# Patient Record
Sex: FEMALE | Race: WHITE | NOT HISPANIC OR LATINO | Employment: FULL TIME | URBAN - METROPOLITAN AREA
[De-identification: names, ages, dates, MRNs, and addresses within clinical notes are randomized per-mention and may not be internally consistent; named-entity substitution may affect disease eponyms.]

---

## 2018-08-10 PROCEDURE — 87086 URINE CULTURE/COLONY COUNT: CPT | Performed by: FAMILY MEDICINE

## 2018-08-12 ENCOUNTER — TELEPHONE (OUTPATIENT)
Dept: URGENT CARE | Facility: CLINIC | Age: 38
End: 2018-08-12

## 2018-08-12 NOTE — TELEPHONE ENCOUNTER
Notified Ms. Tate of urine culture results, she states her symptoms have resolved. Advised if they return follow up with PCP, voiced understanding.

## 2019-01-09 NOTE — PROGRESS NOTES
Encounter Date:2019    Location: [facility]    Keyon Ramsay MD    Patient ID: Karen Tate is a 38 y.o. 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    1980  Subjective:      Chief Complaint/Reason for visit:  Palpitations    Problem List:  1. Palpitations  A. Holter 18: AVG HR 83 bpm, rare PVC's  [id number] X 2  Congenital hep C- treated  LID  appy       HPI: Mrs. Tate is a 38 yr old female who presents in consultation today at the request of Tom Ramsay for further evaluation of her palpitations.  Patient states that she has had intermittent palpitations over the years however a crescendo pattern of late.  She will notice typically at rest fleeting skip beat and she will note that if she coughs it does help resolve the symptoms.  She's had no formal cardiac testing that she can state other than the monitor most recently placed.  Reviewing her diary with the monitor she had a ventricular couplet at which time she was symptomatic with palpitations for 5 seconds.  She notes that she is a busy mother and full-time employee does no regular exercise regimen and is interested in having clearance to do activity with an exercise regimen.    Social History     Socioeconomic History   • Marital status:      Spouse name: Not on file   • Number of children: Not on file   • Years of education: Not on file   • Highest education level: Not on file   Social Needs   • Financial resource strain: Not on file   • Food insecurity - worry: Not on file   • Food insecurity - inability: Not on file   • Transportation needs - medical: Not on file   • Transportation needs - non-medical: Not on file   Occupational History   • Not on file   Tobacco Use   • Smoking status: Never Smoker   • Smokeless tobacco: Never Used   Substance and Sexual Activity   • Alcohol use: No   • Drug use: No   • Sexual activity: Not on file   Other Topics Concern   • Not on file   Social History Narrative            family history is not on file.     has a past medical history of History of hepatitis C.    Allergies   Allergen Reactions   • Sulfa Antibiotics        No current outpatient medications on file.    Review of Systems   Constitution: Negative for chills, fever, weakness, malaise/fatigue, night sweats, weight gain and weight loss.   HENT: Negative for hearing loss and nosebleeds.    Eyes: Negative for blurred vision, vision loss in left eye, vision loss in right eye, visual disturbance and visual halos.   Cardiovascular: Positive for palpitations. Negative for chest pain, claudication, cyanosis, dyspnea on exertion, irregular heartbeat, leg swelling, near-syncope, orthopnea, paroxysmal nocturnal dyspnea and syncope.   Respiratory: Negative for cough, hemoptysis, shortness of breath, snoring and wheezing.    Endocrine: Negative for cold intolerance, heat intolerance, polydipsia, polyphagia and polyuria.   Hematologic/Lymphatic: Negative for adenopathy and bleeding problem. Does not bruise/bleed easily.   Skin: Negative for dry skin, poor wound healing and rash.   Musculoskeletal: Negative for falls, joint pain, joint swelling, muscle cramps, muscle weakness, myalgias and neck pain.   Gastrointestinal: Negative for bloating, abdominal pain, change in bowel habit, bowel incontinence, constipation, diarrhea, dysphagia, excessive appetite, heartburn, hematemesis, hematochezia, jaundice, melena, nausea and vomiting.   Genitourinary: Negative for bladder incontinence, dysuria, flank pain, hematuria, hesitancy and nocturia.   Neurological: Negative for aphonia, excessive daytime sleepiness, dizziness, focal weakness, headaches, light-headedness, loss of balance, seizures, sensory change, tremors and vertigo.   Psychiatric/Behavioral: Negative for altered mental status, depression, memory loss, substance abuse and suicidal ideas. The patient is not nervous/anxious.        Vitals:    01/11/19 1406   BP: 106/60   BP  "Location: Left arm   Patient Position: Sitting   Pulse: 77   Weight: 55.5 kg (122 lb 6.4 oz)   Height: 160 cm (63\")         Objective:       Physical Exam   Constitutional: She is oriented to person, place, and time. She appears well-developed and well-nourished. No distress.   HENT:   Head: Normocephalic and atraumatic.   Nose: Nose normal.   Mouth/Throat: Uvula is midline, oropharynx is clear and moist and mucous membranes are normal.   Eyes: Conjunctivae and EOM are normal. Pupils are equal, round, and reactive to light. No scleral icterus.   Neck: Normal range of motion. Neck supple. No hepatojugular reflux and no JVD present. Carotid bruit is not present. No tracheal deviation present. No thyromegaly present.   Cardiovascular: Normal rate, regular rhythm, S1 normal, S2 normal, intact distal pulses and normal pulses. PMI is not displaced. Exam reveals no gallop, no distant heart sounds, no friction rub, no midsystolic click and no opening snap.   No murmur heard.  Pulses:       Radial pulses are 2+ on the right side, and 2+ on the left side.        Dorsalis pedis pulses are 2+ on the right side, and 2+ on the left side.        Posterior tibial pulses are 2+ on the right side, and 2+ on the left side.   Pulmonary/Chest: Effort normal and breath sounds normal. She has no wheezes. She has no rhonchi. She has no rales.   Abdominal: Soft. Bowel sounds are normal. She exhibits no mass. There is no tenderness. There is no guarding.   Musculoskeletal: She exhibits no edema or tenderness.   Lymphadenopathy:     She has no cervical adenopathy.   Neurological: She is alert and oriented to person, place, and time.   Skin: Skin is warm, dry and intact. No rash noted. No cyanosis or erythema. Nails show no clubbing.   Psychiatric: She has a normal mood and affect. Her behavior is normal.   Nursing note and vitals reviewed.      Data Review:     ECG 12 Lead  Date/Time: 1/11/2019 3:07 PM  Performed by: Yobany Jacob, " MD  Authorized by: Yobany Jacob MD   Comparison: not compared with previous ECG   Rhythm: sinus rhythm  Conduction: incomplete RBBB  Clinical impression: abnormal ECG               Assessment:      Diagnosis Plan   1. Palpitation  Adult Transthoracic Echo Complete W/ Cont if Necessary Per Protocol     Plan:   Echocardiogram to assess for structural heart disease in follow-up thereafter.  Scribed for Yobany Jacob MD by Caitlyn Avina. 1/11/2019  2:56 PM  I, Yobany Jacob MD, personally performed the services described in this documentation as scribed by the above named individual in my presence, and it is both accurate and complete.  1/11/2019  3:00 PM        Please note that portions of this note may have been completed with a voice recognition program. Efforts were made to edit the dictations, but occasionally words are mistranscribed.

## 2019-01-11 ENCOUNTER — CONSULT (OUTPATIENT)
Dept: CARDIOLOGY | Facility: CLINIC | Age: 39
End: 2019-01-11

## 2019-01-11 ENCOUNTER — HOSPITAL ENCOUNTER (OUTPATIENT)
Dept: CARDIOLOGY | Facility: HOSPITAL | Age: 39
Discharge: HOME | End: 2019-01-11
Attending: INTERNAL MEDICINE | Admitting: INTERNAL MEDICINE

## 2019-01-11 VITALS
HEIGHT: 63 IN | SYSTOLIC BLOOD PRESSURE: 106 MMHG | BODY MASS INDEX: 21.69 KG/M2 | WEIGHT: 122.4 LBS | DIASTOLIC BLOOD PRESSURE: 60 MMHG | HEART RATE: 77 BPM

## 2019-01-11 DIAGNOSIS — R00.2 PALPITATION: ICD-10-CM

## 2019-01-11 DIAGNOSIS — I49.3 PVC (PREMATURE VENTRICULAR CONTRACTION): ICD-10-CM

## 2019-01-11 DIAGNOSIS — R00.2 PALPITATIONS: ICD-10-CM

## 2019-01-11 DIAGNOSIS — R00.2 PALPITATION: Primary | ICD-10-CM

## 2019-01-11 LAB
BH CV ECHO MEAS - AO ROOT AREA (BSA CORRECTED): 1.4
BH CV ECHO MEAS - AO ROOT AREA: 4 CM^2
BH CV ECHO MEAS - AO ROOT DIAM: 2.3 CM
BH CV ECHO MEAS - BSA(HAYCOCK): 1.6 M^2
BH CV ECHO MEAS - BSA: 1.6 M^2
BH CV ECHO MEAS - BZI_BMI: 21.6 KILOGRAMS/M^2
BH CV ECHO MEAS - BZI_METRIC_HEIGHT: 160 CM
BH CV ECHO MEAS - BZI_METRIC_WEIGHT: 55.3 KG
BH CV ECHO MEAS - EDV(CUBED): 77.1 ML
BH CV ECHO MEAS - EDV(TEICH): 81 ML
BH CV ECHO MEAS - EF(CUBED): 78.2 %
BH CV ECHO MEAS - EF(TEICH): 70.8 %
BH CV ECHO MEAS - ESV(CUBED): 16.8 ML
BH CV ECHO MEAS - ESV(TEICH): 23.7 ML
BH CV ECHO MEAS - FS: 39.8 %
BH CV ECHO MEAS - IVS/LVPW: 1
BH CV ECHO MEAS - IVSD: 0.73 CM
BH CV ECHO MEAS - LA DIMENSION: 2.4 CM
BH CV ECHO MEAS - LA/AO: 1
BH CV ECHO MEAS - LAD MAJOR: 4.6 CM
BH CV ECHO MEAS - LAT PEAK E' VEL: 16.7 CM/SEC
BH CV ECHO MEAS - LATERAL E/E' RATIO: 4.6
BH CV ECHO MEAS - LV MASS(C)D: 91.8 GRAMS
BH CV ECHO MEAS - LV MASS(C)DI: 58.5 GRAMS/M^2
BH CV ECHO MEAS - LVIDD: 4.3 CM
BH CV ECHO MEAS - LVIDS: 2.6 CM
BH CV ECHO MEAS - LVPWD: 0.73 CM
BH CV ECHO MEAS - MED PEAK E' VEL: 11.4 CM/SEC
BH CV ECHO MEAS - MEDIAL E/E' RATIO: 6.7
BH CV ECHO MEAS - MV A MAX VEL: 56.8 CM/SEC
BH CV ECHO MEAS - MV DEC SLOPE: 515.9 CM/SEC^2
BH CV ECHO MEAS - MV DEC TIME: 0.15 SEC
BH CV ECHO MEAS - MV E MAX VEL: 78 CM/SEC
BH CV ECHO MEAS - MV E/A: 1.4
BH CV ECHO MEAS - MV P1/2T MAX VEL: 93.7 CM/SEC
BH CV ECHO MEAS - MV P1/2T: 53.2 MSEC
BH CV ECHO MEAS - MVA P1/2T LCG: 2.3 CM^2
BH CV ECHO MEAS - MVA(P1/2T): 4.1 CM^2
BH CV ECHO MEAS - PA ACC SLOPE: 460.1 CM/SEC^2
BH CV ECHO MEAS - PA ACC TIME: 0.14 SEC
BH CV ECHO MEAS - PA PR(ACCEL): 17.2 MMHG
BH CV ECHO MEAS - PI END-D VEL: 89.6 CM/SEC
BH CV ECHO MEAS - RAP SYSTOLE: 3 MMHG
BH CV ECHO MEAS - RV MAX PG: 1.3 MMHG
BH CV ECHO MEAS - RV V1 MAX: 56.8 CM/SEC
BH CV ECHO MEAS - RVSP: 14 MMHG
BH CV ECHO MEAS - SI(CUBED): 38.4 ML/M^2
BH CV ECHO MEAS - SI(TEICH): 36.6 ML/M^2
BH CV ECHO MEAS - SV(CUBED): 60.3 ML
BH CV ECHO MEAS - SV(TEICH): 57.4 ML
BH CV ECHO MEAS - TAPSE (>1.6): 2.7 CM2
BH CV ECHO MEAS - TR MAX PG: 11 MMHG
BH CV ECHO MEAS - TR MAX VEL: 162.9 CM/SEC
BH CV ECHO MEASUREMENTS AVERAGE E/E' RATIO: 5.55
BH CV XLRA - RV BASE: 3 CM
BH CV XLRA - RV LENGTH: 6.2 CM
BH CV XLRA - RV MID: 2.8 CM
BH CV XLRA - TDI S': 12.5 CM/SEC
LEFT ATRIUM VOLUME INDEX: 24.9 ML/M^2
LEFT ATRIUM VOLUME: 39 ML
LV EF 2D ECHO EST: 60 %

## 2019-01-11 PROCEDURE — 93000 ELECTROCARDIOGRAM COMPLETE: CPT | Performed by: INTERNAL MEDICINE

## 2019-01-11 PROCEDURE — 93306 TTE W/DOPPLER COMPLETE: CPT

## 2019-01-11 PROCEDURE — 99244 OFF/OP CNSLTJ NEW/EST MOD 40: CPT | Performed by: INTERNAL MEDICINE

## 2019-01-11 PROCEDURE — 93306 TTE W/DOPPLER COMPLETE: CPT | Performed by: INTERNAL MEDICINE

## 2020-03-04 ENCOUNTER — TRANSCRIBE ORDERS (OUTPATIENT)
Dept: MAMMOGRAPHY | Facility: HOSPITAL | Age: 40
End: 2020-03-04

## 2020-03-04 DIAGNOSIS — Z12.31 VISIT FOR SCREENING MAMMOGRAM: Primary | ICD-10-CM

## 2020-05-14 ENCOUNTER — APPOINTMENT (OUTPATIENT)
Dept: MAMMOGRAPHY | Facility: HOSPITAL | Age: 40
End: 2020-05-14

## 2020-07-17 ENCOUNTER — HOSPITAL ENCOUNTER (OUTPATIENT)
Dept: MAMMOGRAPHY | Facility: HOSPITAL | Age: 40
Discharge: HOME | End: 2020-07-17
Admitting: OBSTETRICS & GYNECOLOGY

## 2020-07-17 DIAGNOSIS — Z12.31 VISIT FOR SCREENING MAMMOGRAM: ICD-10-CM

## 2020-07-17 PROCEDURE — 77063 BREAST TOMOSYNTHESIS BI: CPT | Performed by: RADIOLOGY

## 2020-07-17 PROCEDURE — 77063 BREAST TOMOSYNTHESIS BI: CPT

## 2020-07-17 PROCEDURE — 77067 SCR MAMMO BI INCL CAD: CPT | Performed by: RADIOLOGY

## 2020-07-17 PROCEDURE — 77067 SCR MAMMO BI INCL CAD: CPT

## 2021-01-26 ENCOUNTER — IMMUNIZATION (OUTPATIENT)
Dept: VACCINE CLINIC | Facility: HOSPITAL | Age: 41
End: 2021-01-26

## 2021-01-26 PROCEDURE — 0001A: CPT | Performed by: INTERNAL MEDICINE

## 2021-01-26 PROCEDURE — 91300 HC SARSCOV02 VAC 30MCG/0.3ML IM: CPT | Performed by: INTERNAL MEDICINE

## 2021-02-16 ENCOUNTER — IMMUNIZATION (OUTPATIENT)
Dept: VACCINE CLINIC | Facility: HOSPITAL | Age: 41
End: 2021-02-16

## 2021-02-16 PROCEDURE — 91300 HC SARSCOV02 VAC 30MCG/0.3ML IM: CPT | Performed by: INTERNAL MEDICINE

## 2021-02-16 PROCEDURE — 0002A: CPT | Performed by: INTERNAL MEDICINE

## 2021-03-24 ENCOUNTER — E-VISIT (OUTPATIENT)
Dept: FAMILY MEDICINE CLINIC | Facility: TELEHEALTH | Age: 41
End: 2021-03-24

## 2021-03-24 DIAGNOSIS — R39.89 SUSPECTED UTI: Primary | ICD-10-CM

## 2021-03-24 PROCEDURE — 99422 OL DIG E/M SVC 11-20 MIN: CPT | Performed by: NURSE PRACTITIONER

## 2021-03-24 RX ORDER — NITROFURANTOIN 25; 75 MG/1; MG/1
100 CAPSULE ORAL 2 TIMES DAILY
Qty: 14 CAPSULE | Refills: 0 | Status: SHIPPED | OUTPATIENT
Start: 2021-03-24 | End: 2021-03-31

## 2021-03-24 RX ORDER — PHENAZOPYRIDINE HYDROCHLORIDE 200 MG/1
200 TABLET, FILM COATED ORAL 3 TIMES DAILY PRN
Qty: 6 TABLET | Refills: 0 | Status: SHIPPED | OUTPATIENT
Start: 2021-03-24 | End: 2021-03-26

## 2021-03-24 NOTE — PROGRESS NOTES
Karen Tate    1980  [id number]    I have reviewed the e-Visit questionnaire and patient's answers, my assessment and plan are as follows:      HPI  Karen Tate is a 40 y.o. with a 1-4 day history of dysuria described as burning, frequency, urgency, odor to urine.  She denies fever/chills, nausea/vomiting, belly/back pain, hematuria, or vaginal symptoms.  She has had similar symptoms in the past which resolved with antibiotic treatment    Review of Systems - Negative except symptoms listed in HPI      Diagnoses and all orders for this visit:    1. Suspected UTI (Primary)  -     nitrofurantoin, macrocrystal-monohydrate, (MACROBID) 100 MG capsule; Take 1 capsule by mouth 2 (Two) Times a Day for 7 days.  Dispense: 14 capsule; Refill: 0  -     phenazopyridine (PYRIDIUM) 200 MG tablet; Take 1 tablet by mouth 3 (Three) Times a Day As Needed for Bladder Spasms for up to 2 days.  Dispense: 6 tablet; Refill: 0    -Macrobid as prescribed - complete entire course of medication even if you begin to feel better.   --Phenazopyridine is for painful urination and bladder spasms--this medication with cause urine to become bright orange and can stain undergarments.    -Continue to increase your fluid intake.   -Abstain from intercourse during antibiotic treatment.   -Practice good perineal hygiene: wipe front to back  -Do not hold your urine- go to the bathroom every 2-3 hours.     -Warning signs: severe abdominal/pelvic/back pain, fever >101, blood in urine - seek medical attention as soon as possible for a hands on/objective exam and possible labs.     -Follow up with your PCP in 2 days if no improvement in symptoms or if symptoms begin to worsen.       Any medications prescribed have been sent electronically to   [facility] - [place], [place] - [address] [place] [address] - [phone]  - [phone]   [address] [place] [id number]  Phone: [phone] Fax: [phone]      Time Documentation  Counseled  patient  Counseling topics: diagnosis, treatment options and return instructions  Total encounter time: counseling time more than 50% of visit: 20 minutes        ROBERTO Nieves  03/24/21  08:26 EDT

## 2021-03-24 NOTE — PATIENT INSTRUCTIONS
Urinary Tract Infection, Adult    A urinary tract infection (UTI) is an infection of any part of the urinary tract. The urinary tract includes the kidneys, ureters, bladder, and urethra. These organs make, store, and get rid of urine in the body.  Your health care provider may use other names to describe the infection. An upper UTI affects the ureters and kidneys (pyelonephritis). A lower UTI affects the bladder (cystitis) and urethra (urethritis).  What are the causes?  Most urinary tract infections are caused by bacteria in your genital area, around the entrance to your urinary tract (urethra). These bacteria grow and cause inflammation of your urinary tract.  What increases the risk?  You are more likely to develop this condition if:  · You have a urinary catheter that stays in place (indwelling).  · You are not able to control when you urinate or have a bowel movement (you have incontinence).  · You are female and you:  ? Use a spermicide or diaphragm for birth control.  ? Have low estrogen levels.  ? Are pregnant.  · You have certain genes that increase your risk (genetics).  · You are sexually active.  · You take antibiotic medicines.  · You have a condition that causes your flow of urine to slow down, such as:  ? An enlarged prostate, if you are male.  ? Blockage in your urethra (stricture).  ? A kidney stone.  ? A nerve condition that affects your bladder control (neurogenic bladder).  ? Not getting enough to drink, or not urinating often.  · You have certain medical conditions, such as:  ? Diabetes.  ? A weak disease-fighting system (immunesystem).  ? Sickle cell disease.  ? Gout.  ? Spinal cord injury.  What are the signs or symptoms?  Symptoms of this condition include:  · Needing to urinate right away (urgently).  · Frequent urination or passing small amounts of urine frequently.  · Pain or burning with urination.  · Blood in the urine.  · Urine that smells bad or unusual.  · Trouble urinating.  · Cloudy  urine.  · Vaginal discharge, if you are female.  · Pain in the abdomen or the lower back.  You may also have:  · Vomiting or a decreased appetite.  · Confusion.  · Irritability or tiredness.  · A fever.  · Diarrhea.  The first symptom in older adults may be confusion. In some cases, they may not have any symptoms until the infection has worsened.  How is this diagnosed?  This condition is diagnosed based on your medical history and a physical exam. You may also have other tests, including:  · Urine tests.  · Blood tests.  · Tests for sexually transmitted infections (STIs).  If you have had more than one UTI, a cystoscopy or imaging studies may be done to determine the cause of the infections.  How is this treated?  Treatment for this condition includes:  · Antibiotic medicine.  · Over-the-counter medicines to treat discomfort.  · Drinking enough water to stay hydrated.  If you have frequent infections or have other conditions such as a kidney stone, you may need to see a health care provider who specializes in the urinary tract (urologist).  In rare cases, urinary tract infections can cause sepsis. Sepsis is a life-threatening condition that occurs when the body responds to an infection. Sepsis is treated in the hospital with IV antibiotics, fluids, and other medicines.  Follow these instructions at home:    Medicines  · Take over-the-counter and prescription medicines only as told by your health care provider.  · If you were prescribed an antibiotic medicine, take it as told by your health care provider. Do not stop using the antibiotic even if you start to feel better.  General instructions  · Make sure you:  ? Empty your bladder often and completely. Do not hold urine for long periods of time.  ? Empty your bladder after sex.  ? Wipe from front to back after a bowel movement if you are female. Use each tissue one time when you wipe.  · Drink enough fluid to keep your urine pale yellow.  · Keep all follow-up  visits as told by your health care provider. This is important.  Contact a health care provider if:  · Your symptoms do not get better after 1-2 days.  · Your symptoms go away and then return.  Get help right away if you have:  · Severe pain in your back or your lower abdomen.  · A fever.  · Nausea or vomiting.  Summary  · A urinary tract infection (UTI) is an infection of any part of the urinary tract, which includes the kidneys, ureters, bladder, and urethra.  · Most urinary tract infections are caused by bacteria in your genital area, around the entrance to your urinary tract (urethra).  · Treatment for this condition often includes antibiotic medicines.  · If you were prescribed an antibiotic medicine, take it as told by your health care provider. Do not stop using the antibiotic even if you start to feel better.  · Keep all follow-up visits as told by your health care provider. This is important.  This information is not intended to replace advice given to you by your health care provider. Make sure you discuss any questions you have with your health care provider.  Document Revised: 12/05/2019 Document Reviewed: 06/27/2019  [employer] Patient Education © 2021 [employer]

## 2021-07-22 ENCOUNTER — OFFICE VISIT (OUTPATIENT)
Dept: OBSTETRICS AND GYNECOLOGY | Facility: CLINIC | Age: 41
End: 2021-07-22

## 2021-07-22 ENCOUNTER — HOSPITAL ENCOUNTER (OUTPATIENT)
Dept: MAMMOGRAPHY | Facility: HOSPITAL | Age: 41
Discharge: HOME | End: 2021-07-22
Admitting: OBSTETRICS & GYNECOLOGY

## 2021-07-22 VITALS
HEIGHT: 63 IN | SYSTOLIC BLOOD PRESSURE: 100 MMHG | BODY MASS INDEX: 22.86 KG/M2 | DIASTOLIC BLOOD PRESSURE: 60 MMHG | WEIGHT: 129 LBS

## 2021-07-22 DIAGNOSIS — Z01.419 WELL WOMAN EXAM WITH ROUTINE GYNECOLOGICAL EXAM: Primary | ICD-10-CM

## 2021-07-22 DIAGNOSIS — Z12.31 VISIT FOR SCREENING MAMMOGRAM: ICD-10-CM

## 2021-07-22 PROCEDURE — 77067 SCR MAMMO BI INCL CAD: CPT | Performed by: RADIOLOGY

## 2021-07-22 PROCEDURE — 77063 BREAST TOMOSYNTHESIS BI: CPT

## 2021-07-22 PROCEDURE — 99396 PREV VISIT EST AGE 40-64: CPT | Performed by: OBSTETRICS & GYNECOLOGY

## 2021-07-22 PROCEDURE — 77063 BREAST TOMOSYNTHESIS BI: CPT | Performed by: RADIOLOGY

## 2021-07-22 PROCEDURE — 77067 SCR MAMMO BI INCL CAD: CPT

## 2021-07-22 NOTE — PROGRESS NOTES
GYN Annual Exam     CC - Here for annual exam.     Subjective   HPI  Karen Tate is a 40 y.o. female, , who presents for annual well woman exam. Patient's last menstrual period was 07/15/2021..  Periods are regular lasting 7 days.  She reports mild dysmenorrhea. Partner Status: Marital Status: .  New Partners since last visit: no.  Desires STD Screening: no.      H/o LEEP     Additional OB/GYN History     Current contraception: contraceptive methods: Vasectomy   Last Pap :   Last Completed Pap Smear          Ordered - PAP SMEAR (Every 3 Years) Ordered on 2020  Done - neg; HPV neg.              History of abnormal Pap smear: yes - h/o LEEP  Family history of uterine, colon, breast, or ovarian cancer: no  Previous Mammogram :  yes - today; results pending   Performs monthly Self-Breast Exam: yes  Exercises Regularly:yes  Feelings of Anxiety or Depression: no  Tobacco Usage?: No   OB History        2    Para   2    Term   2            AB        Living   2       SAB        TAB        Ectopic        Molar        Multiple        Live Births   2                Health Maintenance   Topic Date Due   • Annual Gynecologic Pelvic and Breast Exam  Never done   • ANNUAL PHYSICAL  Never done   • TDAP/TD VACCINES (1 - Tdap) Never done   • HEPATITIS C SCREENING  Never done   • INFLUENZA VACCINE  10/01/2021   • PAP SMEAR  2023   • COVID-19 Vaccine  Completed   • Pneumococcal Vaccine 0-64  Aged Out     [unfilled]      The additional following portions of the patient's history were reviewed and updated as appropriate: allergies, current medications, past family history, past medical history, past social history, past surgical history and problem list.    Review of Systems   Constitutional: Negative.    HENT: Negative.    Eyes: Negative.    Respiratory: Negative.    Cardiovascular: Negative.    Gastrointestinal: Negative.    Endocrine: Negative.    Genitourinary: Negative.  "   Musculoskeletal: Negative.    Skin: Negative.    Allergic/Immunologic: Negative.    Neurological: Negative.    Hematological: Negative.    Psychiatric/Behavioral: Negative.        I have reviewed and agree with the HPI, ROS, and historical information as entered above. Rebecca Gardiner MD    Objective   /60   Ht 160 cm (63\")   Wt 58.5 kg (129 lb)   LMP 07/15/2021   Breastfeeding No   BMI 22.85 kg/m²     Physical Exam  Vitals and nursing note reviewed. Exam conducted with a chaperone present.   Constitutional:       Appearance: She is well-developed.   HENT:      Head: Normocephalic and atraumatic.   Eyes:      Pupils: Pupils are equal, round, and reactive to light.   Neck:      Thyroid: No thyroid mass or thyromegaly.   Pulmonary:      Effort: Pulmonary effort is normal. No retractions.   Chest:      Chest wall: No mass.      Breasts:         Right: Normal. No mass, nipple discharge, skin change or tenderness.         Left: Normal. No mass, nipple discharge, skin change or tenderness.   Abdominal:      General: Bowel sounds are normal.      Palpations: Abdomen is soft. Abdomen is not rigid. There is no mass.      Tenderness: There is no abdominal tenderness. There is no guarding.      Hernia: No hernia is present. There is no hernia in the left inguinal area or right inguinal area.   Genitourinary:     Exam position: Lithotomy position.      Pubic Area: No rash.       Labia:         Right: No rash, tenderness or lesion.         Left: No rash, tenderness or lesion.       Urethra: No urethral pain or urethral swelling.      Vagina: Normal. No vaginal discharge or lesions.      Cervix: No cervical motion tenderness, discharge, lesion or cervical bleeding.      Uterus: Normal. Not enlarged, not fixed and not tender.       Adnexa:         Right: No mass, tenderness or fullness.          Left: No mass, tenderness or fullness.        Rectum: No external hemorrhoid.   Musculoskeletal:      Cervical back: Normal " range of motion. No muscular tenderness.      Right lower leg: No edema.      Left lower leg: No edema.   Skin:     General: Skin is warm and dry.   Neurological:      Mental Status: She is alert and oriented to person, place, and time.      Motor: Motor function is intact.   Psychiatric:         Mood and Affect: Mood and affect normal.         Behavior: Behavior normal.         Assessment/Plan       Encounter Diagnosis   Name Primary?   • Well woman exam with routine gynecological exam Yes       Plan     1. Recommended use of Vitamin D replacement and getting adequate calcium in her diet. (1500mg)  2. Reviewed monthly self breast exams.  Instructed to call with lumps, pain, or breast discharge.    3. Continue yearly mammography  4. Reviewed HPV guidelines.  5. Reviewed exercise as a preventative health measures.       Rebecca Gardiner MD  07/22/2021

## 2021-07-24 ENCOUNTER — TRANSCRIBE ORDERS (OUTPATIENT)
Dept: ADMINISTRATIVE | Facility: HOSPITAL | Age: 41
End: 2021-07-24

## 2021-07-24 DIAGNOSIS — Z12.31 VISIT FOR SCREENING MAMMOGRAM: Primary | ICD-10-CM

## 2021-08-19 ENCOUNTER — APPOINTMENT (OUTPATIENT)
Dept: MAMMOGRAPHY | Facility: HOSPITAL | Age: 41
End: 2021-08-19

## 2021-08-19 ENCOUNTER — HOSPITAL ENCOUNTER (OUTPATIENT)
Dept: ULTRASOUND IMAGING | Facility: HOSPITAL | Age: 41
Discharge: HOME | End: 2021-08-19

## 2021-08-19 ENCOUNTER — HOSPITAL ENCOUNTER (OUTPATIENT)
Dept: MAMMOGRAPHY | Facility: HOSPITAL | Age: 41
Discharge: HOME | End: 2021-08-19

## 2021-08-19 DIAGNOSIS — R92.8 ABNORMAL MAMMOGRAM: ICD-10-CM

## 2021-08-19 PROCEDURE — 76642 ULTRASOUND BREAST LIMITED: CPT

## 2021-08-19 PROCEDURE — 77066 DX MAMMO INCL CAD BI: CPT | Performed by: RADIOLOGY

## 2021-08-19 PROCEDURE — 76642 ULTRASOUND BREAST LIMITED: CPT | Performed by: RADIOLOGY

## 2021-08-19 PROCEDURE — 77066 DX MAMMO INCL CAD BI: CPT

## 2021-08-19 PROCEDURE — G0279 TOMOSYNTHESIS, MAMMO: HCPCS

## 2021-08-19 PROCEDURE — 77062 BREAST TOMOSYNTHESIS BI: CPT | Performed by: RADIOLOGY

## 2022-02-21 ENCOUNTER — E-VISIT (OUTPATIENT)
Dept: FAMILY MEDICINE CLINIC | Facility: TELEHEALTH | Age: 42
End: 2022-02-21

## 2022-02-21 ENCOUNTER — E-VISIT (OUTPATIENT)
Dept: ADMINISTRATIVE | Facility: OTHER | Age: 42
End: 2022-02-21

## 2022-02-21 PROCEDURE — BRIGHTMDVISIT: Performed by: NURSE PRACTITIONER

## 2022-02-21 NOTE — E-VISIT ESCALATED
Chief Complaint: Bladder infection (UTI)   Patient was shown the following escalation message:   Some conditions need a visit with a healthcare provider   Because your UTI symptoms may be related to your recent urological care, you should speak with a healthcare provider.   ----------   Patient Interview Transcript:   Knowing about your anatomy is important for diagnosing and treating UTIs. The gender we have on file for you is female, but we realize that this might not tell the whole story. Would you like to tell us more about your anatomy?    Yes   Not selected:    No   OK, which of these organs do you have? Select all that apply.    Vagina    Ovaries    Uterus    Breasts   Not selected:    Penis    Testes    Prostate   Which of these symptoms do you have? Select all that apply.    Pain or burning while urinating   Not selected:    Frequent urination    Sudden urge to urinate   How long have you had these symptoms? Select one.    Less than 24 hours   Not selected:    1 to 3 days    4 to 6 days    7 to 10 days    More than 10 days   Since your current symptoms started, has it been difficult to start, stop, or delay urination? Select one.    No   Not selected:    Yes    I'm not sure   What color is your urine? Select one.    Cloudy   Not selected:    Clear    Yellow    Pink or red    I'm not sure   Does your urine smell strange (like ammonia) or stronger than usual? Select one.    Yes   Not selected:    No   Do your symptoms include any of these? Select all that apply.    No   Not selected:    Fever    Nausea    Vomiting    Pain, pressure, or discomfort in the lower abdomen    Back pain   Do you have any flank pain? The flank is the side of the body between the ribs and the hips.    No   Not selected:    Yes, in my left flank    Yes, in my right flank    Yes, in both my left and right flanks   Do you have any vaginal symptoms? Select all that apply.    No   Not selected:    Abnormal vaginal itching    Unscheduled  or abnormal vaginal bleeding or spotting    Pain during sex    Visible sores on the vagina    Abnormal vaginal discharge   In the past 2 weeks, have you had a medical device or instrument placed in your urinary tract? Examples include catheters, stents, and nephrostomy tubes. Select one.    Yes   Not selected:    No   ----------   Medical history   Medical history data does not currently exist for this patient.

## 2022-02-21 NOTE — E-VISIT TREATED
Chief Complaint: Bladder infection (UTI)   Patient introduction   Patient is 41-year-old female who reports dysuria for < 24 hours.   Urine is described as cloudy with a strong or pungent odor.   Patient provided the following organ inventory: Presence of a vagina, ovaries, a uterus, and breasts.   Patient did not request an excuse note.   General presentation   Denies fever. Denies nausea and vomiting.   Denies stomach/pelvic pain.   Denies back pain.   Denies flank pain. Denies difficulty starting, stopping, or delaying urination.   Denies use of OTC acetaminophen, ibuprofen, or phenazopyridine for current symptoms.   Reports previous history of UTI. Current symptoms feel exactly the same as previous UTIs. Reports receiving treatment for UTI 1-3 times in last year. Patient's last use of antibiotics for UTI was > 1 month ago.   Reports trying nitrofurantoin monohydrate for their past UTIs. They found it helpful.   Reports developing yeast infections as a result of taking antibiotics for past UTIs.   Reports sexual activity in the past week. Denies current diaphragm use. Denies unprotected sexual intercourse with a new partner in the last 2 weeks. Denies exposure to sexually transmitted infections in the last month.   Patient denies recent history of cycling, motorcycling, or riding horseback. Patient denies recent use of tight-fitting pants/undergarments. Patient reports recent changes in soaps or cosmetics.   Patient denies current treatment for diabetes mellitus.   Denies the following red flags:    Recent hospitalizations or nursing home care (last 3 months)    History of renal failure    History of pyelonephritis    History of nephrolithiasis    Recent history of urological instrumentation    Anatomic abnormalities of the urinary tract    Abnormal vaginal discharge    Visible vaginal sores    Pain with sexual intercourse    Abnormal vaginal bleeding or spotting   Pregnancy/menstrual status/breastfeeding:    Denies being pregnant. Denies breastfeeding. Regarding last menstrual period, patient writes: 2/7/22.   Current medications   Denies taking other medications or supplements.   Medication allergies   None.   Medication contraindication review   Denies currently taking ACE inhibitors and ARBs.   Denies history of anaphylactic reaction to beta-lactams; folate deficiency; G6PD deficiency; arrhythmia; coronary artery disease; megaloblastic anemia; mononucleosis; myasthenia gravis; cholestatic jaundice; oliguria/anuria; and TMP/SMX-associated thrombocytopenia.   No known history of amoxicillin-clavulanate-associated cholestatic jaundice or nitrofurantoin-associated cholestatic jaundice.   Past medical history   Immune conditions: Denies immunocompromising conditions. Denies history of cancer.   Assessment   Uncomplicated acute UTI.   This is the likely diagnosis based on patient's reported symptoms and history, including:    Previous history of UTI    Current symptoms are exactly the same as previous UTIs    Urine described as cloudy with a strong odor    Recent history of delaying urination   Plan   Medications:    fluconazole 150 mg tablet RX 150mg 1 tab PO once 1d as a single dose for vaginal yeast infection. Repeat in 72 hours if symptoms persist. Amount is 2 tab.    phenazopyridine 200 mg tablet RX 200mg 1 tab PO tid PRN 2d for pain or discomfort associated with your condition. Amount is 6 tab.    nitrofurantoin monohydrate/macrocrystals 100 mg capsule RX 100mg 1 cap PO q12h 5d for infection. This medication is an antibiotic. Take it exactly as directed. You must finish the entire course of medication, even if you feel better after taking the first few doses. Amount is 10 cap.   The patient's prescriptions will be sent to:   [facility]   [address] [address]   Phone: [phone]     Fax: [phone]   Education:    Condition and causes    Prevention    Treatment and self-care    When to  call provider   Follow-up:   Patient to follow up as needed for progression or lack of improvement in symptoms within 3d.      ----------   Electronically signed by ROBERTO Tse on 2022-02-21 at 17:08PM   ----------   Patient Interview Transcript:   Knowing about your anatomy is important for diagnosing and treating UTIs. The gender we have on file for you is female, but we realize that this might not tell the whole story. Would you like to tell us more about your anatomy?    Yes   Not selected:    No   OK, which of these organs do you have? Select all that apply.    Vagina    Ovaries    Uterus    Breasts   Not selected:    Penis    Testes    Prostate   Which of these symptoms do you have? Select all that apply.    Pain or burning while urinating   Not selected:    Frequent urination    Sudden urge to urinate   How long have you had these symptoms? Select one.    Less than 24 hours   Not selected:    1 to 3 days    4 to 6 days    7 to 10 days    More than 10 days   Since your current symptoms started, has it been difficult to start, stop, or delay urination? Select one.    No   Not selected:    Yes    I'm not sure   What color is your urine? Select one.    Cloudy   Not selected:    Clear    Yellow    Pink or red    I'm not sure   Does your urine smell strange (like ammonia) or stronger than usual? Select one.    Yes   Not selected:    No   Do your symptoms include any of these? Select all that apply.    No   Not selected:    Fever    Nausea    Vomiting    Pain, pressure, or discomfort in the lower abdomen    Back pain   Do you have any flank pain? The flank is the side of the body between the ribs and the hips.    No   Not selected:    Yes, in my left flank    Yes, in my right flank    Yes, in both my left and right flanks   Do you have any vaginal symptoms? Select all that apply.    No   Not selected:    Abnormal vaginal itching    Unscheduled or abnormal vaginal bleeding or spotting    Pain during sex     Visible sores on the vagina    Abnormal vaginal discharge   In the past 2 weeks, have you had a medical device or instrument placed in your urinary tract? Examples include catheters, stents, and nephrostomy tubes. Select one.    No   Not selected:    Yes   Have you recently been hospitalized or been a resident of a nursing home or other long-term care facility? This doesn't include emergency room (ER) visits. Select one.    No   Not selected:    Yes, within the last 2 weeks    Yes, within the last 3 months   Have you ever had severe problems with your kidneys, such as kidney failure? Select one.    No   Not selected:    Yes   Have you ever had a kidney infection (pyelonephritis)? Select one.    No   Not selected:    Yes, within the last year    Yes, over a year ago    I'm not sure   Have you ever had kidney stones? Select one.    No   Not selected:    Yes, within the last year    Yes, over a year ago    I'm not sure   Have you ever been diagnosed with any of these? Select all that apply.    No   Not selected:    Urinary reflux    Bladder diverticula    Single (or horseshoe) kidney    Duplicated urethra    I'm not sure   Have you recently spent a lot of time cycling, riding a motorcycle, or riding horseback? Select one.    No   Not selected:    Yes   Have you recently held your urine for a long time after you felt the urge to go? Select one.    Yes   Not selected:    No   Have you recently avoided eating or drinking so you wouldn't have the urge to urinate as often? Select one.    No   Not selected:    Yes   Have you recently worn any tight-fitting underwear, tights, leggings, pants, or jeans? Select one.    No   Not selected:    Yes   Have you recently used any new soaps, lotions, or cosmetics on your genital area? Select one.    Yes   Not selected:    No   Do you currently use a diaphragm? Select one.    No   Not selected:    Yes   Are you pregnant? Select one.    No   Not selected:    Yes   When was your last  menstrual period? If you don't currently have periods or no longer have periods, please briefly explain.    2/7/22   Are you breastfeeding? Select one.    No   Not selected:    Yes   Have you had sexual intercourse in the past week? Recent sexual intercourse is a risk factor for urinary tract infections. Select one.    Yes   Not selected:    No   Have you been exposed to a sexually transmitted infection (STI or STD) in the last month? Examples include chlamydia, gonorrhea, trichomoniasis, and herpes. Select one.    No, not that I know of   Not selected:    Yes    I'm not sure   Have you had unprotected sexual intercourse with a new partner in the last 2 weeks? Select one.    No   Not selected:    Yes   Have you traveled outside of the [place] in the last 6 months? Select one.    No   Not selected:    Yes   Have you taken any of these non-prescription medications for your current symptoms? Non-prescription medications are the kind you can buy without a prescription. Select any that may apply.    No   Not selected:    Acetaminophen (Tylenol)    Ibuprofen (Advil, Motrin)    Phenazopyridine (Azo, Pyridium, Baridium, Uricalm, Uristat)   Have you ever had a urinary tract infection (UTI) before? A UTI is often called a bladder infection. Select one.    Yes   Not selected:    No    I'm not sure   How much do your current symptoms feel like past UTIs? Select one.    Exactly the same   Not selected:    Mostly the same    Somewhat the same    Totally different   In the past year, how many times have you taken antibiotics for a UTI? Select one.    1 to 3   Not selected:    0    4 or more   When did you last take antibiotics for a UTI? Select one.    More than 1 month ago   Not selected:    Less than 1 month ago   Which of these antibiotics have you taken for UTIs in the past? Select all that apply.    Nitrofurantoin (Macrobid)   Not selected:    Amoxicillin-clavulanate (Augmentin)    Cefdinir (Omnicef)    Cefuroxime  (Ceftin)    Cephalexin (Daxbia, Keflex)    Ciprofloxacin (Cipro)    Fosfomycin    TMP/SMX (Bactrim, Bactrim DS, Sulfatrim)    Trimethoprim (Primsol)    Other (specify)    I'm not sure   Did nitrofurantoin work well for you? Select one.    Yes   Not selected:    No   Have you ever developed a yeast infection as a result of taking antibiotics? Select one.    Yes   Not selected:    No    I'm not sure   UTIs may be more serious when other factors are present. Let's address those now. Are you currently being treated for type 1 or type 2 diabetes? Select one.    No   Not selected:    Yes   Do you have any of these conditions that can affect the immune system? Scroll to see all options. Select all that apply.    None of these   Not selected:    History of bone marrow transplant    Chronic kidney disease    Chronic liver disease (including cirrhosis)    HIV/AIDS    Inflammatory bowel disease (Crohn's disease or ulcerative colitis)    Lupus    Moderate to severe plaque psoriasis    Multiple sclerosis    Rheumatoid arthritis    Sickle cell anemia    Alpha or beta thalassemia    History of solid organ transplant (kidney, liver, or heart)    History of spleen removal    An autoimmune disorder not listed here    A condition requiring treatment with long-term use of oral steroids (such as prednisone, prednisolone, or dexamethasone)   Have you ever been diagnosed with cancer? Select one.    No   Not selected:    Yes, I have cancer now    Yes, but I'm in remission   These last few questions will help us create the right treatment plan for you. Are you currently being treated for any of these conditions? Select all that apply.    No   Not selected:    Anaphylactic reaction to beta-lactam antibiotics (penicillins, cephalosporins, carbapenems)    Folate deficiency    G6PD deficiency    Heart arrhythmia    Heart disease    Megaloblastic anemia    Mono (mononucleosis)    Myasthenia gravis    Oliguria or anuria    TMP/SMX-associated low  blood platelet count (thrombocytopenia)   Have you ever had jaundice as a result of taking amoxicillin-clavulanate (Augmentin) or nitrofurantoin (Macrobid)? Select all that apply.    No   Not selected:    Yes, from amoxicillin-clavulanate (Augmentin)    Yes, from nitrofurantoin (Macrobid)   Are you taking any of these medications? Select all that apply.    No   Not selected:    An ACE inhibitor such as lisinopril, enalapril, captopril, or benazepril    An angiotensin II receptor blocker (ARB) such as candesartan, irbesartan, losartan, or valsartan   Are you taking any other medications or supplements? On the next screen, you need to list all vitamins, supplements, non-prescription medications (such as aspirin or Aleve), and prescription medications that you're taking. Select one.    No   Not selected:    Yes    Yes, but I'm not sure what they are   Have you ever had an allergic or bad reaction to any medication? Select one.    No   Not selected:    Yes   Do you need a doctor's note? A doctor's note confirms that you received care today and states when you can return to school or work. It does not contain information about your diagnosis or treatment plan. Your provider will make the final decision on whether to give you a doctor's note. Doctor's notes CANNOT be backdated. Select one.    No   Not selected:    Today only (1 day)   Is there anything else you'd like to tell us about your symptoms?   The patient did not enter any additional information.   ----------   Medical history   The following information was received from the EMR on February 21, 2022.   Allergies:    SULFA ANTIBIOTICS   - Allergy Type: medication   - Reaction:   - Severity:   - Status: active

## 2022-02-21 NOTE — EXTERNAL PATIENT INSTRUCTIONS
Diagnosis   Urinary tract infection (UTI)   My name is Daisha Nicole. I'm a healthcare provider at [facility]. After reviewing your interview, I see you have a urinary tract infection (UTI).   Medications   Your pharmacy   [place] [id number] [phone]     Prescription      Fluconazole (150mg): Take 1 tablet by mouth once for 1 day as a single dose. Use this medication only if you develop a yeast infection. If yeast infection symptoms are still present 3 days after taking the first tablet, take the second tablet.      Phenazopyridine (200mg): Take 1 tablet by mouth three times a day as needed for 2 days for pain or discomfort associated with your condition.      Nitrofurantoin monohydrate/macrocrystalline (100mg): Take 1 tablet by mouth every 12 hours for 5 days for infection. This medication is an antibiotic. Take it exactly as directed. You must finish the entire course of medication, even if you feel better after taking the first few doses.    I've given you a prescription dose of phenazopyridine. If it's more affordable or convenient, you may use the equivalent amount of non-prescription phenazopyridine. For example, instead of taking one 200 mg phenazopyridine tablet, you may take two 95 mg phenazopyridine tablets.    Because you've developed yeast infections after taking antibiotics in the past, I've prescribed Diflucan (fluconazole). Diflucan is an oral antifungal that treats yeast infections. Use this medication only if you develop a yeast infection.   About your diagnosis   A UTI is an infection of one or more parts of the urinary tract, most commonly the bladder.   Most UTIs are caused by bacteria (usually E. coli.) that travel up the urethra and affect the bladder. I see that you have some common signs and symptoms of a UTI:    Pain or burning while urinating    Symptoms that feel a lot like past UTIs    Cloudy colored urine    Strange or strong smelling  urine    Symptoms that began after sexual intercourse   Fortunately, most UTIs aren't serious, and they're easily treated with antibiotics. Make sure you take all of the antibiotic pills given to you. Otherwise, the UTI might come back.   What to expect   If you follow this treatment plan, you should start to feel better within 1 to 2 days.   When to seek care   Call us at [phone]   with any sudden or unexpected symptoms.    Symptoms that don't improve or get worse in the next 48 hours    Fever that goes above 101F or lasts longer than 24 hours    Shaking or chills    Nausea or vomiting    Severe flank pain (pain in your back or side)   Other treatment    Rest and drink plenty of water    Urinate frequently and when you first feel the urge    Place a heating pad on your back or stomach to help relieve some of the discomfort   Prevention    Drink a lot of liquids to help flush bacteria from your system. Water is best. Try for six to eight, 8-ounce glasses a day on a regular basis.    Urinate often and when you first feel the urge. Bacteria can grow when urine stays in the bladder too long. Urinate after sex to flush away bacteria.    After using the toilet, always wipe from front to back. This step is most important after a bowel movement. Wiping from front to back prevents bacteria normally found in stool from entering the urinary tract.   Your provider   Your diagnosis was provided by Daisha Nicole, a member of your trusted care team at [facility].   If you have any questions, call us at [phone]  .

## 2022-07-28 ENCOUNTER — HOSPITAL ENCOUNTER (OUTPATIENT)
Dept: MAMMOGRAPHY | Facility: HOSPITAL | Age: 42
Discharge: HOME | End: 2022-07-28
Admitting: OBSTETRICS & GYNECOLOGY

## 2022-07-28 ENCOUNTER — OFFICE VISIT (OUTPATIENT)
Dept: OBSTETRICS AND GYNECOLOGY | Facility: CLINIC | Age: 42
End: 2022-07-28

## 2022-07-28 VITALS
WEIGHT: 131.6 LBS | SYSTOLIC BLOOD PRESSURE: 102 MMHG | BODY MASS INDEX: 23.32 KG/M2 | HEIGHT: 63 IN | DIASTOLIC BLOOD PRESSURE: 70 MMHG

## 2022-07-28 DIAGNOSIS — Z01.419 WOMEN'S ANNUAL ROUTINE GYNECOLOGICAL EXAMINATION: Primary | ICD-10-CM

## 2022-07-28 DIAGNOSIS — Z12.31 VISIT FOR SCREENING MAMMOGRAM: ICD-10-CM

## 2022-07-28 PROCEDURE — 77067 SCR MAMMO BI INCL CAD: CPT

## 2022-07-28 PROCEDURE — 99396 PREV VISIT EST AGE 40-64: CPT | Performed by: OBSTETRICS & GYNECOLOGY

## 2022-07-28 PROCEDURE — 77063 BREAST TOMOSYNTHESIS BI: CPT | Performed by: RADIOLOGY

## 2022-07-28 PROCEDURE — 77063 BREAST TOMOSYNTHESIS BI: CPT

## 2022-07-28 PROCEDURE — 77067 SCR MAMMO BI INCL CAD: CPT | Performed by: RADIOLOGY

## 2022-07-28 NOTE — PROGRESS NOTES
Gynecologic Annual Exam Note          GYN Annual Exam     Gynecologic Exam        Subjective     HPI  Karen Tate is a 41 y.o. female, , who presents for annual well woman exam as a established patient . Patient's last menstrual period was 2022..  Periods are regular every 25-35 days, lasting 7 days. The flow is moderate. Dysmenorrhea:none. .Pt has c/o left lower abd pain in middle of cycle, painful to the touch at times. Patient reports problems with: changes in libido.  Partner Status: Marital Status: . She is is sexually active. She has not had new partners.. STD testing recommendations have been explained to the patient and she does not desire STD testing. There were no changes to her medical or surgical history since her last visit.. She had a mammogram today, suggested to have MRI's completed due to dense breast tissue.   Desires annual paps.       Additional OB/GYN History   Current contraception: contraceptive methods: Vasectomy   Desires to: do not start contraception    Last Pap : 21. Result: negative. HPV: unknown   Last Completed Pap Smear          Ordered - PAP SMEAR (Every 3 Years) Ordered on 2021  SCANNED - PAP SMEAR    2020  Done - neg; HPV neg.              History of abnormal Pap smear: yes - LEEP  Family history of uterine, colon, breast, or ovarian cancer: no  Performs monthly Self-Breast Exam: yes  Last mammogram: 22. Done at [facility].    Last Completed Mammogram     This patient has no relevant Health Maintenance data.          History of abnormal mammogram: no    Colonoscopy: has never had a colonoscopy.  Exercises Regularly: yes  Feelings of Anxiety or Depression: no  Tobacco Usage?: No     No current outpatient medications on file.     Patient denies the need for medication refills today.    OB History        2    Para   2    Term   2            AB        Living   2       SAB        IAB        Ectopic         "Molar        Multiple        Live Births   2                Past Medical History:   Diagnosis Date   • Abnormal Pap smear of cervix     LEEP   • Hepatitis C Diagnosed , treated May 2007-2008    Treated for Hepatitis C (PEG Interferon, Ribavirin)   • History of hepatitis C    • HPV (human papilloma virus) infection     Never confirmed but assumed due to the abnormal pap smears   • PMS (premenstrual syndrome) Monthly   • Urinary tract infection 2022    Took macrobid   • Varicella Childhood        Past Surgical History:   Procedure Laterality Date   • APPENDECTOMY     • CERVICAL BIOPSY  W/ LOOP ELECTRODE EXCISION  2009   •  SECTION      x2   • WISDOM TOOTH EXTRACTION         Health Maintenance   Topic Date Due   • ANNUAL PHYSICAL  Never done   • TDAP/TD VACCINES (1 - Tdap) Never done   • HEPATITIS C SCREENING  Never done   • Annual Gynecologic Pelvic and Breast Exam  2022   • INFLUENZA VACCINE  10/01/2022   • PAP SMEAR  2024   • COVID-19 Vaccine  Completed   • Pneumococcal Vaccine 0-64  Aged Out       The additional following portions of the patient's history were reviewed and updated as appropriate: allergies, current medications, past family history, past medical history, past social history and past surgical history.    Review of Systems   Constitutional: Negative.    HENT: Negative.    Eyes: Negative.    Respiratory: Negative.    Cardiovascular: Negative.    Gastrointestinal: Negative.    Endocrine: Negative.    Genitourinary: Positive for pelvic pain.   Musculoskeletal: Negative.    Allergic/Immunologic: Negative.    Neurological: Negative.    Hematological: Negative.    Psychiatric/Behavioral: Negative.          I have reviewed and agree with the HPI, ROS, and historical information as entered above. Rebecca Gardiner MD      Objective   /70   Ht 160 cm (63\")   Wt 59.7 kg (131 lb 9.6 oz)   LMP 2022   BMI 23.31 kg/m²     Physical Exam  Vitals and " nursing note reviewed. Exam conducted with a chaperone present.   Constitutional:       Appearance: She is well-developed.   HENT:      Head: Normocephalic and atraumatic.   Eyes:      Pupils: Pupils are equal, round, and reactive to light.   Neck:      Thyroid: No thyroid mass or thyromegaly.   Pulmonary:      Effort: Pulmonary effort is normal. No retractions.   Chest:      Chest wall: No mass.   Breasts:      Right: Normal. No mass, nipple discharge, skin change or tenderness.      Left: Normal. No mass, nipple discharge, skin change or tenderness.       Abdominal:      General: Bowel sounds are normal.      Palpations: Abdomen is soft. Abdomen is not rigid. There is no mass.      Tenderness: There is no abdominal tenderness. There is no guarding.      Hernia: No hernia is present. There is no hernia in the left inguinal area or right inguinal area.   Genitourinary:     Exam position: Lithotomy position.      Pubic Area: No rash.       Labia:         Right: No rash, tenderness or lesion.         Left: No rash, tenderness or lesion.       Urethra: No urethral pain or urethral swelling.      Vagina: Normal. No vaginal discharge or lesions.      Cervix: No cervical motion tenderness, discharge, lesion or cervical bleeding.      Uterus: Normal. Not enlarged, not fixed and not tender.       Adnexa:         Right: No mass, tenderness or fullness.          Left: No mass, tenderness or fullness.        Rectum: No external hemorrhoid.   Musculoskeletal:      Cervical back: Normal range of motion. No muscular tenderness.      Right lower leg: No edema.      Left lower leg: No edema.   Skin:     General: Skin is warm and dry.   Neurological:      Mental Status: She is alert and oriented to person, place, and time.      Motor: Motor function is intact.   Psychiatric:         Mood and Affect: Mood and affect normal.         Behavior: Behavior normal.            Assessment and Plan    Problem List Items Addressed This Visit     None     Visit Diagnoses     Women's annual routine gynecological examination    -  Primary    Relevant Orders    LIQUID-BASED PAP SMEAR, [facility] ([facility])          1. GYN annual well woman exam.   2. Pap guidelines reviewed.  3. Reviewed monthly self breast exams.  Instructed to call with lumps, pain, or breast discharge.    4. Ordered Mammogram today  5. Recommended use of Vitamin D replacement and getting adequate calcium in her diet. (1500mg)  6. Reviewed exercise as a preventative health measures.   7. RTC in 1 year or PRN with problems.  8. Return in about 1 year (around 7/28/2023) for Annual physical.     Rebecca Gardiner MD  07/28/2022

## 2022-08-01 LAB — REF LAB TEST METHOD: NORMAL

## 2022-08-21 ENCOUNTER — E-VISIT (OUTPATIENT)
Dept: FAMILY MEDICINE CLINIC | Facility: TELEHEALTH | Age: 42
End: 2022-08-21

## 2022-08-21 PROCEDURE — BRIGHTMDVISIT: Performed by: NURSE PRACTITIONER

## 2022-08-21 NOTE — E-VISIT TREATED
Chief Complaint: Coronavirus (COVID-19), cold, sinus pain, allergy, or flu   Patient introduction   Patient is 41-year-old female with cough, fever (which may have resolved; see below), congestion, nasal discharge, itchy or watery eyes, and itchy nose or sneezing that started 3 to 5 days ago. Regarding date of symptom onset, patient writes: 8/18/22.   When asked why they are seeking care online today, patient responds that they just want to feel better.   Patient has not requested COVID testing.   COVID-19 exposure, testing history, and vaccination status:    Patient had a self-test within the last week. Patient specifies date of test as 8/19/22. Test result was positive.    Received 3 doses of the COVID-19 vaccine (Pfizer, Pfizer, Pfizer).   Received their most recent dose of the vaccine more than 14 days ago.   Warning. The following may warrant further investigation:    Nuchal rigidity with fever but without headache.   Patient did not request an excuse note.   General presentation   Symptoms came on gradually.   Fever:    Has had 1 to 3 days of measured fever.    Has current measured fever, but none at initial symptom onset.    Highest temperature of below 100.4F.   Sinus and nasal symptoms:    Nasal discharge.    Itchy nose or sneezing.    Clear nasal drainage.    Nasal drainage is thin.    Postnasal drip.    Congestion with sinus pain or pressure on or around the forehead, eyes, nose, cheeks, and upper teeth or jaw.    Patient first noticed sinus pain less than 5 days ago.    Sinus pain is worse with Valsalva.   Throat symptoms:    No sore throat.   Head and body aches:    No headache.    No sweats.    No chills.    No myalgia.    No fatigue.   Cough:    Cough is not noticeably worse depending on time of day    Cough is mildly productive of sputum.    Describes color of sputum as clear.   Wheezing and shortness of breath:    Wheezing.    No COPD diagnosis.    No asthma diagnosis.    No shortness of  breath.    No previous albuterol inhaler use for URIs, bronchitis, or pneumonia.    No previous steroid inhaler use for URIs, bronchitis, or pneumonia.   Chest pain:    No chest pain.   Ear symptoms:    Current symptoms include pressure, crackling or popping, and a plugged or blocked sensation in the ear(s).   Dizziness:    No dizziness.   Flu exposure:    Has not had a flu vaccine this season.   Review of red flags/alarm symptoms:    No changes in alertness or awareness.    No fever above 100.4F lasting longer than 4 days.    No decreased urination.   Pregnancy/menstrual status/breastfeeding:    Not pregnant.    Not breastfeeding.    Regarding last menstrual period, patient writes: 8/2/22.   Self-exam:    Difficulty moving their chin toward their chest.    Swollen and tender neck lymph nodes.    Mild periorbital edema.   Current medications   Patient is taking over-the-counter medications for current symptoms, including guaifenesin, guaifenesin/dextromethorphan, ibuprofen, and phenylephrine.   Taking Melatonin.   Medication allergies   No relevant drug allergies.   Medication contraindication review   Patient may be eligible for treatment with Paxlovid. Use of Paxlovid with drugs that either induce CYP3 or are highly dependent on CYP3 for clearance may lead to significant drug interactions.   No history of anaphylactic reaction to beta-lactam antibiotics; aspirin triad; blood dyscrasia; bone marrow depression; catecholamine-releasing paraganglioma; coronary artery disease; coagulation disorder; congenital long QT syndrome; depression; electrolyte abnormalities; fungal infection; GI bleeding; GI obstruction; G6PD deficiency; heart arrhythmia; hypertension; mononucleosis; myasthenia; recent myocardial infarction; NSAID-induced asthma/urticaria; Parkinson's disease; pheochromocytoma; porphyria; Reye syndrome; seizure disorder; ulcerative colitis; and urinary retention.   No history of metoclopramide-associated  dystonic reaction and tardive dyskinesia.   No known history of amoxicillin-clavulanate-associated cholestatic jaundice or hepatic impairment.   No known history of azithromycin-associated cholestatic jaundice or hepatic impairment.   Past medical history   Immune conditions: No immunocompromising conditions. No history of cancer.   Social history   Never smoked tobacco.   Assessment   COVID-19, confirmed by viral test. Ruled out: Traumatic laryngitis.   [facility] required information for COVID-19 lab data reporting (if test is ordered):   Symptoms:    Fever.    Cough.    Nasal congestion.    Nasal discharge.    Itchy nose or sneezing.    Itchy or watery eyes.   Symptom onset: 3 to 5 days ago ago  Pregnant? No.  Healthcare worker? No.  Resident in a congregate care setting? No.  Previous history of COVID-19 testing: Patient had a self-test within the last week. Patient specifies date of test as 8/19/22. Test result was positive.     Plan   Medications:    benzonatate 200 mg capsule RX 200mg 1 cap PO tid PRN 7d for cough. Do not chew or cut these capsules. Amount is 21 cap.    Mucus Relief  mg tablet, extended release RX 600mg 1 tab PO q12h PRN 7d for cough and congestion. Amount is 14 tab.   The patient's prescriptions will be sent to:   [place] [id number]   [address] [facility] [address]   Phone: [phone]     Fax: [phone]   Education:    Condition and causes    Prevention    Treatment and self-care    When to call provider   ----------   Electronically signed by ROBERTO Norris on 2022-08-21 at 09:32AM   ----------   Patient Interview Transcript:   Why are you getting care through eVisit today? We can't guarantee a specific treatment or test. Your provider will decide what's best for you. Select all that apply.    I just want to feel better!   Not selected:    I want to know if I have a cold or something more serious    I want to know if I need to be seen by a provider    I need a doctor's note     I want to be tested for COVID-19    I want to get the COVID-19 vaccine    I think I'm having side effects from the COVID-19 vaccine    None of the above   COVID-19 symptoms are similar to symptoms of other illnesses. This makes it difficult to diagnose. Please carefully consider each question and answer as best you can. This will help your provider make the right diagnosis. Which of these symptoms are bothering you? Select all that apply.    Cough    Fever    Stuffed-up nose or sinuses    Runny nose    Itchy or watery eyes    Itchy nose or sneezing   Not selected:    Shortness of breath    Loss of smell or taste    Sore throat    Hoarse voice or loss of voice    Headache    Sweats    Chills    Muscle or body aches    Fatigue or tiredness    Nausea or vomiting    Diarrhea    I don't have any of these symptoms   Do you have any of these conditions? These conditions can put you at increased risk for severe disease if you're infected with COVID-19. Select all that apply.    None of the above   Not selected:    Chronic lung disease, such as cystic fibrosis or interstitial fibrosis    Heart disease, such as congenital heart disease, congestive heart failure, or coronary artery disease    Disorder of the brain, spinal cord, or nerves and muscles, such as dementia, cerebral palsy, epilepsy, muscular dystrophy, or developmental delay    Metabolic disorder or mitochondrial disease    Cerebrovascular disease, such as stroke or another condition affecting the blood vessels or blood supply to the brain    Down syndrome    Mood disorder, including depression or schizophrenia spectrum disorders    Substance use disorder, such as alcohol, opioid, or cocaine use disorder    Tuberculosis   Do you live in a group care setting? Examples include: - Nursing home - Residential care - Psychiatric treatment facility - Group home - Dormitory - Board and care home - Homeless shelter - Foster care setting Select one.    No   Not selected:     Yes   Have you ever been tested for COVID-19? Select one.    Yes   Not selected:    No   When was your most recent COVID-19 test? Select one.    Within the last week (specify date as MM/DD/YY): 8/19/22   Not selected:    7 to 14 days ago    15 to 30 days ago    1 to 3 months ago    More than 3 months ago   What type of COVID-19 test did you most recently have? There are two types of COVID-19 tests: - Viral tests check if you're currently infected with COVID-19. For these tests, a nose swab or saliva sample is taken. Viral tests include self-tests and tests done at a doctor's office, lab, or testing site. - Antibody tests check if you've been infected in the past. For these tests, your blood is drawn. Antibody tests can only be done at a doctor's office, lab, or testing site. Select one.    Viral self-test   Not selected:    Viral test at a doctor's office, lab, or testing site    Antibody test   What was the result of your most recent COVID-19 test? Select one.    Positive   Not selected:    Negative    I'm not sure   Have you gotten the COVID-19 vaccine? Select one.    Yes   Not selected:    No   How many doses of the COVID-19 vaccine have you gotten? This includes boosters as well as third or fourth doses for those who are immunocompromised. Select one.    3 doses   Not selected:    1 dose    2 doses    4 doses   1st dose    Pfizer   Not selected:    J&J/Cornelio    Moderna    Novavax   2nd dose    Pfizer   Not selected:    J&J/Cornelio    Moderna    Novavax   3rd dose    Pfizer   Not selected:    J&J/Cornelio    Moderna    Novavax   When did you get your most recent dose of the COVID-19 vaccine?    More than 14 days ago   Not selected:    Less than 48 hours (2 days) ago    48 to 72 hours (3 days) ago    3 to 5 days ago    5 to 7 days ago    7 to 14 days ago   When did your current symptoms start? Select one.    3 to 5 days ago   Not selected:    Less than 48 hours ago    6 to 9 days ago    10 to 14 days ago    2 to  4 weeks ago    More than a month ago   Do you know the exact date your symptoms started? If so, enter the date as MM/DD/YY. Select one.    Yes (specify): 8/18/22   Not selected:    No   Did your symptoms come on suddenly or gradually? Select one.    Gradually   Not selected:    Suddenly    I'm not sure   You mentioned having a fever. Do you have a fever now? Select one.    Yes, but I didn't have one when my symptoms started   Not selected:    Yes, and I've had one since my symptoms started    No, it's gone now    I'm not sure   Did you take your temperature with a thermometer? Select one.    Yes   Not selected:    No, but it felt mild    No, but it felt high   What was the highest reading on the thermometer? Select one.    Below 100.4F   Not selected:    100.4 to 101.5F    101.6 to 101.9F    102.0 to 103.0F    Above 103.0F   How long have you had a fever? Select one.    1 to 3 days   Not selected:    Less than 24 hours    4 or more days   Do you cough so hard that it's made you gag or vomit? By gag, we mean has your coughing made you choke or dry heave? Select all that apply.    No   Not selected:    Yes, my coughing has made me gag    Yes, my coughing has made me vomit   When is your cough the worst? Select all that apply.    I haven't noticed a difference depending on time of day   Not selected:    In the morning, or when I wake up    During the day    At nighttime, or while I'm sleeping   Are you coughing up mucus or phlegm? Select one.    Yes, a little   Not selected:    No, my cough is dry    Yes, a lot   What color is most of the mucus or phlegm that you're coughing up? Select one.    Clear   Not selected:    White/frothy    Yellow    Green    Red or pink    I'm not sure   You mentioned having a stuffy nose or sinus congestion. Do you feel pain or pressure in your sinuses?    Yes   Not selected:    No    I'm not sure   Where do you feel sinus pain or pressure?    In my forehead    Around my eyes    Behind my  "nose    In my cheeks    In my upper teeth or jaw   Not selected:    I'm not sure   When did you first notice your sinus pain or pressure? Select one.    Less than 5 days ago   Not selected:    5 to 9 days ago    10 to 14 days ago    2 to 4 weeks ago    1 month ago or longer   Does coughing, sneezing, or leaning forward make your sinuses feel worse? Select one.    Yes   Not selected:    No    I'm not sure   What color is your nasal drainage? Select one.    Clear   Not selected:    White    Yellow    Green    My nose is stuffed but not draining or running    I'm not sure   Is your nasal drainage thick or thin? Select one.    Thin   Not selected:    Thick    I'm not sure   Is there any drainage (mucus) going down the back of your throat? This kind of drainage is also called \"postnasal drip.\" Select one.    Yes   Not selected:    No    I'm not sure   Since your symptoms started, have you felt dizzy? Select one.    No   Not selected:    Yes, but I can continue with my regular daily activities    Yes, and it makes it hard to stand, walk, or do daily activities   Do you have chest pain? You might also feel it as discomfort, aching, tightness, or squeezing in the chest. Select one.    No   Not selected:    Yes   Have you urinated at least 3 times in the last 24 hours? Select one.    Yes   Not selected:    No    I'm not sure   Changes in alertness or awareness may mean you need emergency care. Since your symptoms started, have you had any of these? Select all that apply.    None of the above   Not selected:    Confusion    Slurred speech    Not knowing where you are or what day it is    Difficulty staying conscious    Fainting or passing out   Do your symptoms include a whistling sound, or wheezing, when you breathe? Select one.    Yes   Not selected:    No    I'm not sure   Are your eyelids or the areas around your eyes puffy? Select one.    Yes, but I can easily open my eye(s)   Not selected:    Yes, and it's hard to open " my eye(s)    Yes, and my eye(s) are completely swollen shut    No   Do you have any of these symptoms in your ear(s)? Select all that apply.    Pressure    Crackling or popping    Plugged or blocked sensation   Not selected:    Pain    Fullness    None of the above   Can you move your chin toward your chest?    No, my neck is too stiff   Not selected:    Yes   Are your glands/lymph nodes swollen, or does it hurt when you touch them?    Yes   Not selected:    No    I'm not sure   People with a very high body mass index (BMI) are at higher risk for developing complications from the flu and severe illness from COVID-19. To determine your BMI, we need to know your weight and height. Please enter your weight (in pounds).    Weight   Please enter your height.    Height   Have you ever been diagnosed with asthma? Select one.    No   Not selected:    Yes   Have you ever been prescribed albuterol to use for wheezing, cough, or shortness of breath caused by a cold, bronchitis, or pneumonia? Albuterol (ProAir, Proventil, Ventolin) is prescribed as an inhaler or a solution to be used with a nebulizer machine. Select one.    No   Not selected:    Yes    I'm not sure   Have you ever been prescribed a steroid inhaler to use for wheezing, cough, or shortness of breath caused by a cold, bronchitis, or pneumonia? Some examples of steroid inhalers include Pulmicort, Flovent, Qvar, and Alvesco. Select one.    No   Not selected:    Yes    I'm not sure   Have you ever been diagnosed with chronic obstructive pulmonary disease (COPD)? Select one.    No   Not selected:    Yes    I'm not sure   Have you had a flu shot this season? Select one.    No   Not selected:    Yes, less than 2 weeks ago    Yes, 2 to 4 weeks ago    Yes, 1 to 3 months ago    Yes, 3 to 6 months ago    Yes, more than 6 months ago    I'm not sure   The flu and COVID-19 can be more serious for people with certain conditions or characteristics. These questions help us figure  out if you or anyone you live with is at higher risk for complications from these infections. Do either of these statements apply to you? Select all that apply.    None of the above   Not selected:    I'm  or Native [place]    I'm a healthcare worker   Do you smoke tobacco? Select one.    No   Not selected:    Yes, every day    Yes, some days    No, I quit   Do you have any of these conditions that can affect the immune system? Scroll to see all options. Select all that apply.    None of these   Not selected:    History of bone marrow transplant    Chronic kidney disease    Chronic liver disease (including cirrhosis)    HIV/AIDS    Inflammatory bowel disease (Crohn's disease or ulcerative colitis)    Lupus    Moderate to severe plaque psoriasis    Multiple sclerosis    Rheumatoid arthritis    Sickle cell anemia    Alpha or beta thalassemia    History of solid organ transplant (kidney, liver, or heart)    History of spleen removal    An autoimmune disorder not listed here    A condition requiring treatment with long-term use of oral steroids (such as prednisone, prednisolone, or dexamethasone)   Have you ever been diagnosed with cancer? Select one.    No   Not selected:    Yes, I have cancer now    Yes, but I'm in remission   Some conditions can put you at risk for more serious infections. Do any of these apply to you? Select all that apply.    None of the above   Not selected:    I've been hospitalized within the last 5 days    I have diabetes    I'm in close contact with a child in    Are you currently being treated for any of these conditions? Scroll to see all options. Select all that apply.    None of the above   Not selected:    Aspirin triad (also known as Samter's triad or ASA triad)    Asthma or hives from taking aspirin or other NSAIDs, such as ibuprofen or naproxen    Blockage or narrowing of the blood vessels of the heart    Blood dyscrasia, such anemia, leukemia, lymphoma, or  myeloma    Bone marrow depression    Catecholamine-releasing paraganglioma    Blood clotting disorder    Congenital long QT syndrome    Depression    Difficulty urinating or completely emptying your bladder    Uncorrected electrolyte abnormalities    Fungal infection    Gastrointestinal (GI) bleeding    Gastrointestinal (GI) obstruction    G6PD deficiency    Recent heart attack    High blood pressure    Irregular heartbeat or heart rhythm    Mononucleosis (mono)    Myasthenia gravis    Parkinson's disease    Pheochromocytoma    Reye syndrome    Seizure disorder    Ulcerative colitis   Have you ever had either of these conditions? Select all that apply.    No   Not selected:    Metoclopramide-associated dystonic reaction    Tardive dyskinesia   Are you pregnant? Select one.    No   Not selected:    Yes   When was your last menstrual period? If you don't currently have periods or no longer have periods, please briefly explain.    22   Within the last 2 weeks, have you: - Given birth - Had a miscarriage - Had a pregnancy loss - Had an  Being postpartum (live birth or loss) within the last 2 weeks increases your risk of flu complications. Select one.    No   Not selected:    Yes   Are you breastfeeding? Select one.    No   Not selected:    Yes   Do any of these apply to the people who live with you? Select all that apply.    None of the above   Not selected:    A child under the age of 5    An adult 65 or older    A person who is pregnant    A person who has given birth, had a miscarriage, had a pregnancy loss, or had an  in the last 2 weeks    An  or Native [place]   Does any member of your household have any of these medical conditions? Select all that apply.    None of the above   Not selected:    Asthma    Disorders of the brain, spinal cord, or nerves and muscles, such as dementia, cerebral palsy, epilepsy, muscular dystrophy, or developmental delay    Chronic lung disease, such  as COPD or cystic fibrosis    Heart disease, such as congenital heart disease, congestive heart failure, or coronary artery disease    Cerebrovascular disease, such as stroke or another condition affecting the blood vessels or blood supply to the brain    Blood disorders, such as sickle cell disease    Diabetes    Metabolic disorders such as inherited metabolic disorders or mitochondrial disease    Kidney disorders    Liver disorders    Weakened immune system due to illness or medications such as chemotherapy or steroids    Children under the age of 19 who are on long-term aspirin therapy    Extreme obesity (BMI > 40)   Just a few more questions about medications, and then you're finished. Have you used any non-prescription medications or nasal sprays for your current symptoms? Examples include saline sprays, decongestants, NyQuil, and Tylenol. Select one.    Yes   Not selected:    No   Which of these non-prescription medications have you tried? Scroll to see all options. Select all that apply.    Guaifenesin (Mucinex)    Guaifenesin/dextromethorphan (Delsym DM, Mucinex DM, Robitussin DM)    Ibuprofen (Advil, Motrin, Midol)    Phenylephrine (Sudafed)   Not selected:    Acetaminophen (Tylenol)    Budesonide (Rhinocort)    Cetirizine (Zyrtec)    Chlorpheniramine (Aller-chlor, Chlor-Trimeton)    Cromolyn (NasalCrom)    Dextromethorphan (Delsym, Robitussin, Vicks DayQuil Cough)    Diphenhydramine (Benadryl)    Fexofenadine (Allegra)    Fluticasone (Flonase)    Ketotifen (Alaway, Zaditor)    Loratadine (Alavert, Claritin)    Naphazoline-pheniramine (Naphcon-A, Opcon-A, Visine-A)    Omeprazole (Prilosec)    Oxymetazoline (Afrin)    Triamcinolone (Nasacort)    None of the above   Have you taken any monoamine oxidase inhibitor (MAOI) medications in the last 14 days? Examples include rasagiline (Azilect), selegiline (Eldepryl, Zelapar), isocarboxazid (Marplan), phenelzine (Nardil), and tranylcypromine (Parnate). Select  "one.    No, not that I know of   Not selected:    Yes   Do you take Kynmobi or Apokyn (apomorphine)? Select one.    No   Not selected:    Yes    I'm not sure   Are you taking any other medications or supplements? On the next screen, you need to list all vitamins, supplements, non-prescription medications (such as aspirin or Aleve), and prescription medications that you're taking. Select one.    Yes   Not selected:    Yes, but I'm not sure what they are    No   Have you ever had an allergic or bad reaction to any medication? Select one.    Yes   Not selected:    No   Have you had an allergic or bad reaction to any of these medications? Select all that apply.    No, not that I know of   Not selected:    Baloxavir (Xofluza)    Benzonatate (Tessalon Perles)    Fluconazole, itraconazole, or terconazole (brands include Diflucan, Sporanox, Terazol)    Oseltamivir (Tamiflu) or zanamivir (Relenza)    Paxlovid, nirmatrelvir, or ritonavir (Norvir)   Have you had an allergic or bad reaction to any of these antibiotic medications? Select all that apply.    None of the above   Not selected:    Penicillin or any \"-cillin\" antibiotic, such as amoxicillin, ampicillin, dicloxacillin, nafcillin, or piperacillin (Brands include Augmentin, Unasyn, and Zosyn)    Tetracycline or any \"-cycline\" antibiotic, such as doxycycline, demeclocycline, minocycline (Brands include Declomycin, Doryx, Dynacin, Oracea, Monodox, Panmycin, and Vibramycin)    Ciprofloxacin or any \"-floxacin\" antibiotic, such as gemifloxacin, levofloxacin, moxifloxacin, or ofloxacin (Brands include Factive, Cipro, Floxin, and Levaquin)    Cephalexin or any \"cef-\" antibiotic, such as cefazolin, cefdinir, cefuroxime, ceftriaxone, ceftazidime, or cefepime (Brands include Ancef, Ceftin, Fortaz, Keflex, Maxipime, Rocephin, and Simplicef)    Azithromycin or any \"-thromycin\" antibiotic, such as erythromycin or clarithromycin (Brands include Biaxin, Erythrocin, Z-jyothi, and " Zithromax)    Clindamycin or lincomycin (Brands include Cleocin and Lincocin)    I'm not sure   Have you had an allergic or bad reaction to any of these medications? Select all that apply.    None of the above   Not selected:    Albuterol or a similar medication    Corticosteroid (steroid) medication, including topical steroids, inhaled steroids, nasal steroids, or oral steroids (budesonide, ciclesonide, dexamethasone, flunisolide, fluticasone, methylprednisolone, triamcinolone, prednisone (or brand names Alvesco, Deltasone, Flovent, Medrol, Nasacort, Rhinocort, or Veramyst)    Metoclopramide (Reglan)    Ondansetron (Zuplenz, Zofran ODT, Zofran)    Prochlorperazine (Compazine)    I'm not sure   Have you had an allergic or bad reaction to any of these eye drops or nasal sprays? Scroll to see all options. Select all that apply.    None of the above   Not selected:    Azelastine (Astelin, Astepro, Optivar)    Cromolyn (Crolom, NasalCrom)    Ipratropium (Atrovent)    Ketotifen (Alaway, Zaditor)    Pheniramine/naphazoline (Naphcon-A, Opcon-A, Visine-A)    Olopatadine (Pataday, Patanol, Pazeo)    I'm not sure   Have you had an allergic or bad reaction to any of these non-prescription medications? Scroll to see all options. Select all that apply.    None of the above   Not selected:    Acetaminophen (Tylenol)    Aspirin    Cetirizine (Zyrtec)    Chlorpheniramine (Aller-chlor, Chlor-Trimeton)    Dextromethorphan (Delsym, Robitussin, Vicks DayQuil Cough)    Diphenhydramine (Benadryl)    Fexofenadine (Allegra)    Guaifenesin (Mucinex)    Guaifenesin/dextromethorphan (Delsym DM, Mucinex DM, Robitussin DM)    Ibuprofen (Advil, Motrin, Midol)    Loratadine (Alavert, Claritin)    Oxymetazoline (Afrin)    Phenylephrine (Sudafed)    I'm not sure   Are you allergic to milk or to the proteins found in milk (for example, whey or casein)? A milk allergy is different from lactose intolerance. Select one.    No   Not selected:    Yes     I'm not sure   Have you ever had jaundice or liver problems as a result of taking amoxicillin-clavulanate (Augmentin)? Jaundice is a condition in which the skin and the whites of the eyes turn yellow. Select all that apply.    No, not that I know of   Not selected:    Yes, jaundice    Yes, liver problems   Have you ever had jaundice or liver problems as a result of taking azithromycin (Zithromax, Zmax)? Jaundice is a condition in which the skin and the whites of the eyes turn yellow. Select all that apply.    No, not that I know of   Not selected:    Yes, jaundice    Yes, liver problems   Do you need a doctor's note? A doctor's note confirms that you received care today and states when you can return to school or work. It does not contain information about your diagnosis or treatment plan. Your provider will make the final decision on whether to give you a doctor's note and for how long. Doctor's notes CANNOT be backdated. We can't provide medical leave paperwork through this type of visit. If more paperwork is needed to request time off, contact your primary care provider. Select one.    No   Not selected:    Today only (1 day)    Today and tomorrow (2 days)    3 days    5 days    7 days    10 days    14 days   Is there anything else you'd like to tell us about your symptoms?   The patient did not enter any additional information.   ----------   Medical history   Medical history data does not currently exist for this patient.

## 2022-08-21 NOTE — EXTERNAL PATIENT INSTRUCTIONS
Diagnosis   COVID-19 disease   My name is Shelby Key, and I'm a healthcare provider at [facility]. I've reviewed your interview. Based on your responses and recent positive COVID-19 test, I see that your symptoms are caused by a COVID-19 infection.   Since you tested positive on a self-test, you don't need another test. A positive self-test means that the COVID-19 virus was detected, and you very likely have a COVID-19 infection.   Most people with COVID-19 have mild to moderate symptoms and can rest at home until they get better.    Stay home   While you're recovering, STAY HOME for at least 5 full days. Don't leave your home except to get medical care.   While at home, avoid close contact with others.   If possible, stay in a room away from other people in your home, and use a separate bathroom.   Wear a well-fitting face mask when you can't avoid contact with other people.   If you can't wear a face mask because of breathing difficulty, your caregiver should wear a face mask.   Wearing a face mask does NOT mean you can leave your home. You must continue to stay home for at least 5 full days.   You can return to your normal activities when ALL of the following are true:    You've been fever-free for at least 24 hours (1 full day) without using fever-reducing medications such as Tylenol    Your symptoms have improved    It's been at least 5 full days since your symptoms first started   Prevention    Cover your mouth and nose with a tissue when you cough or sneeze. Throw used tissues in a lined trash can right away, and wash your hands immediately after.    Avoid sharing personal items like dishes, utensils, towels, or bedding. Wash items thoroughly with soap and water after use.    Wash your hands often with soap and water for at least 20 seconds. If soap and water are not available, clean your hands with a hand  that contains at least 60% alcohol. Cover all surfaces of your hands and rub them  "together until they feel dry. Here's a simple recipe for homemade hand :    2 parts 95% non-denatured ethanol alcohol or 99% to 100% isopropyl alcohol    1 part moisturizer such as Aloe Vera or 100% glycerol    Avoid touching your face, especially your eyes, nose, and mouth.    Clean \"high-touch\" surfaces daily. \"High-touch\" surfaces include counters, tabletops, doorknobs, bathroom fixtures, toilets, phones, keyboards, tablets, and bedside tables. You can use soap, detergents, 60%-80% ethanol or isopropyl alcohol,  such as Windex, or bleach. All of these  are effective at killing the virus that causes COVID-19.    Limit contact with pets and other animals while sick. If you must care for your pet, wash your hands before and after you interact with them and wear a face mask.   What to expect   Follow the advice in the treatment section below and you should feel better within 7 to 14 days. You may continue to feel tired and have a cough for several weeks.   Medications   Your pharmacy   [place] [id number] [id number] [phone]     Prescription   Benzonatate (200mg): Take 1 capsule by mouth 3 times a day as needed for cough. Do not chew or cut these capsules.   Mucus Relief ER oral tablet, extended release (600mg): Take 1 tablet by mouth every 12 hours as needed for cough and congestion.   About your diagnosis   Common symptoms of COVID-19 include fever, cough, shortness of breath, fatigue, muscle or body aches, headaches, new loss of sense of taste or smell, sore throat, stuffy or runny nose, nausea or vomiting, and diarrhea. Most people who get COVID-19 have mild symptoms and can rest at home until they get better. Elderly people and those with chronic medical problems may be at risk for more serious complications.   When to seek care   Call us at [phone]   with any sudden or unexpected symptoms.   Call your healthcare provider immediately if you " have any of the following:    Fever over 103F    Fever that doesn't come down when you take Tylenol or ibuprofen    Fever that returns after being gone for more than 24 hours    Fever for more than 4 days    Worsening shortness of breath or difficulty breathing   Go to your nearest ER or call 911 if you have any of the following:    Shortness of breath that makes it difficult to do simple things like get dressed, bathe, or comb your hair    Persistent chest pain or chest tightness    New confusion or difficulty staying alert    Bluish color to the lips or face   Other treatment    Rest and drink plenty of sugar-free fluids.    Use a clean humidifier or a cool-mist vaporizer in your room at night. Breathing humid air may help with nasal congestion.    Gargle with salt water several times a day to help your throat feel better. Cough drops and throat lozenges may provide extra relief. A teaspoon of honey stirred into warm water or weak tea can help soothe a sore throat and cough.    Try using a Neti Pot to flush out your stuffy nose and sinuses. Neti Pots are available at any drugstore without a prescription.    Avoid smoke and air pollution. Smoke can make infections worse.    Coronavirus (COVID-19) information   Common symptoms of COVID-19 include fever, cough, shortness of breath, fatigue, muscle or body aches, headaches, new loss of sense of taste or smell, sore throat, stuffy or runny nose, nausea or vomiting, and diarrhea. Most people who get COVID-19 have mild symptoms and can rest at home until they get better. Elderly people and those with chronic medical problems may be at risk for more serious complications.   FAQs about the COVID-19 vaccine   There are four authorized COVID-19 vaccines: Yaakov & Yaakov's Cornelio Vaccine (J&J/Cornelio), Moderna, Novavax, and Pfizer-BioNTech (Pfizer). The J&J/Cornelio and Novavax vaccines are approved for use in people aged 18 and older. The Moderna and Pfizer vaccines are  approved for those aged 6 months and older. All four are available at no cost. Even if you don't have health insurance, you can still get the COVID-19 vaccine for free.   Which vaccine is the best? Which vaccine should I get?   All four vaccines are highly effective. Even if you get COVID-19 after being vaccinated, all of the vaccines help prevent severe disease, hospitalization, and complications.   Most people should get whichever vaccine is first available to them. However, women younger than 50 years old should consider the rare risk of blood clots with low platelets after vaccination with the J&J/ReadOz vaccine. This risk hasn't been seen with the other three vaccines.   Are the vaccines safe?   Yes. Hundreds of millions of people in the [place] have already safely received COVID-19 vaccines. As part of Phase 3 clinical trials in the [place] and other countries, researchers collected safety and efficacy data for all four vaccines. These clinical trials follow strict standards. Before a vaccine is approved, the manufacturing company must submit data to the Food and Drug Administration (FDA) for review. Tens of thousands of volunteers participated in the clinical trials for the vaccines. The FDA continues to monitor safety data as the vaccines are given to the general population.   Do I need the vaccine if I've already had COVID?   Yes. Vaccination helps protect you even if you've already had COVID.   If you had COVID-19 and had symptoms, wait to get vaccinated until ALL of the following are true:    5 full days since your symptoms started    24 hours with no fever, without the use of fever-reducing medications    Your other COVID-19 symptoms are improving   If you tested positive for COVID-19 but did not have symptoms, you can get vaccinated after 5 full days have passed since you had a positive test, as long as you don't develop symptoms.   If you had COVID-19 and were treated with monoclonal antibodies, you should wait  90 days before getting a COVID-19 vaccination.   How many doses of the vaccine do I need?   Visit www.cdc.gov/coronavirus/2019-ncov/vaccines/stay-up-to-date.html   to find out how to stay up to date with your COVID-19 vaccines.   I'm immunocompromised. How many doses of the vaccine do I need?   For information on how immunocompromised people can stay up to date with their COVID-19 vaccines, visit www.cdc.gov/coronavirus/2019-ncov/vaccines/recommendations/immuno.html  .   What are the common side effects of the vaccine?   A sore arm, tiredness, headache, and muscle pain may occur within two days of getting the vaccine and last a day or two. For the Moderna or Pfizer vaccines, side effects are more common after the second dose. People over the age of 55 are less likely to have side effects than younger people.   After I'm up to date on vaccines, can I still get or spread COVID?   Yes, but your disease should be milder. And your risk of serious illness, hospitalization, and complications will be much lower, especially if you're up to date. Being vaccinated reduces the risk of spreading the disease if you get it.   After I'm up to date on vaccines, can I go back to normal?   You should still wear a mask indoors in public if:    It's required by laws, rules, regulations, or local guidance.    You have a weakened immune system.    Your age puts you at increased risk of severe disease.    You have a medical condition that puts you at increased risk of severe disease.    Someone in your household has a weakened immune system, is at increased risk for severe disease, or is unvaccinated.    You're in an area of high transmission.   Where can I get a COVID-19 vaccine?   Visit [facility]'s website for more information. To find a COVID-19 vaccination site near you, visit www.vaccines.gov/  , call [phone]  , or text your zip code to [id number] ([employer]). Message and data rates may apply.   What about travel?   Travel  increases your risk of exposure to COVID-19. For more information, see www.cdc.gov/coronavirus/2019-ncov/travelers/index.html  .   I've had close contact with someone who has COVID. Do I need to quarantine, and if so, for how long?   For the most current answer, including a calculator to determine whether you need to stay home and for how long, visit www.cdc.gov/coronavirus/2019-ncov/your-health/quarantine-isolation.html  .   I've had a positive test result for COVID. How long do I need to isolate?   For the latest recommendations, including a calculator to determine how long you need to stay home, visit www.cdc.gov/coronavirus/2019-ncov/your-health/quarantine-isolation.html  .   What if I develop symptoms that might be from COVID?   For the latest recommendations on what to do if you're sick, including when to seek emergency care, visit www.cdc.gov/coronavirus/2019-ncov/if-you-are-sick/steps-when-sick.html  .    Flu vaccine information   Who should get a flu vaccine?   Everyone 6 months of age and older should get a yearly flu vaccine.   When should I get vaccinated?   You should get a flu vaccine by the end of October. Once you're vaccinated, it takes about two weeks for antibodies to develop and protect you against the flu. That's why it's important to get vaccinated as soon as possible.   After October, is it too late to get vaccinated?   No. You should still get vaccinated. As long as the flu viruses are still in your community, flu vaccines will remain available, even into January of next year or later.   Why do I need a flu vaccine EVERY year?   Flu viruses are constantly changing, so flu vaccines are usually updated from one season to the next. Your protection from the flu vaccine also lessens over time.   Is the flu vaccine safe?   Yes. Over the last 50 years, hundreds of millions of Americans have safely received the flu vaccines.   What are the side effects of flu vaccines?   You CANNOT get the flu from a  flu vaccine. Common side effects of the flu shot include soreness, redness and/or swelling where the shot was given, low grade fever, and aches. Common side effects of the nasal spray flu vaccine for adults include runny nose, headaches, sore throat, and cough. For children, side effects include wheezing, vomiting, muscle aches, and fever.   Does the flu vaccine increase your risk of getting COVID-19?   No. There is no evidence that getting a flu vaccine increases your risk of getting COVID-19.   Is it safe to get the flu vaccine along with a COVID-19 vaccine?   Yes. It's safe to get the flu vaccine with a COVID-19 vaccine or booster.   Contact your healthcare provider TODAY for details on when and where to get your flu vaccine.   Your provider   Your diagnosis was provided by Shelby Key, a member of your trusted care team at [facility].   If you have any questions, call us at [phone]  .

## 2022-09-29 ENCOUNTER — HOSPITAL ENCOUNTER (OUTPATIENT)
Dept: ULTRASOUND IMAGING | Facility: HOSPITAL | Age: 42
Discharge: HOME | End: 2022-09-29

## 2022-09-29 ENCOUNTER — HOSPITAL ENCOUNTER (OUTPATIENT)
Dept: MAMMOGRAPHY | Facility: HOSPITAL | Age: 42
Discharge: HOME | End: 2022-09-29

## 2022-09-29 DIAGNOSIS — R92.8 ABNORMAL MAMMOGRAM: ICD-10-CM

## 2022-09-29 PROCEDURE — 77061 BREAST TOMOSYNTHESIS UNI: CPT | Performed by: RADIOLOGY

## 2022-09-29 PROCEDURE — 76642 ULTRASOUND BREAST LIMITED: CPT | Performed by: RADIOLOGY

## 2022-09-29 PROCEDURE — 77065 DX MAMMO INCL CAD UNI: CPT | Performed by: RADIOLOGY

## 2022-09-29 PROCEDURE — 77065 DX MAMMO INCL CAD UNI: CPT

## 2022-09-29 PROCEDURE — 76642 ULTRASOUND BREAST LIMITED: CPT

## 2022-09-29 PROCEDURE — G0279 TOMOSYNTHESIS, MAMMO: HCPCS

## 2023-01-27 ENCOUNTER — TRANSCRIBE ORDERS (OUTPATIENT)
Dept: ADMINISTRATIVE | Facility: HOSPITAL | Age: 43
End: 2023-01-27
Payer: COMMERCIAL

## 2023-01-27 DIAGNOSIS — Z12.31 VISIT FOR SCREENING MAMMOGRAM: Primary | ICD-10-CM

## 2023-08-07 ENCOUNTER — HOSPITAL ENCOUNTER (OUTPATIENT)
Dept: MAMMOGRAPHY | Facility: HOSPITAL | Age: 43
Discharge: HOME | End: 2023-08-07
Admitting: OBSTETRICS & GYNECOLOGY
Payer: COMMERCIAL

## 2023-08-07 ENCOUNTER — OFFICE VISIT (OUTPATIENT)
Dept: OBSTETRICS AND GYNECOLOGY | Facility: CLINIC | Age: 43
End: 2023-08-07
Payer: COMMERCIAL

## 2023-08-07 VITALS
SYSTOLIC BLOOD PRESSURE: 104 MMHG | BODY MASS INDEX: 24.49 KG/M2 | WEIGHT: 138.2 LBS | HEIGHT: 63 IN | DIASTOLIC BLOOD PRESSURE: 70 MMHG

## 2023-08-07 DIAGNOSIS — N94.6 DYSMENORRHEA: ICD-10-CM

## 2023-08-07 DIAGNOSIS — Z01.419 ENCOUNTER FOR ANNUAL ROUTINE GYNECOLOGICAL EXAMINATION: Primary | ICD-10-CM

## 2023-08-07 DIAGNOSIS — N94.10 FEMALE DYSPAREUNIA: ICD-10-CM

## 2023-08-07 DIAGNOSIS — N89.8 VAGINAL DRYNESS: ICD-10-CM

## 2023-08-07 DIAGNOSIS — Z12.31 VISIT FOR SCREENING MAMMOGRAM: ICD-10-CM

## 2023-08-07 PROCEDURE — 99396 PREV VISIT EST AGE 40-64: CPT

## 2023-08-07 PROCEDURE — 77067 SCR MAMMO BI INCL CAD: CPT

## 2023-08-07 PROCEDURE — 77063 BREAST TOMOSYNTHESIS BI: CPT

## 2023-08-07 NOTE — PROGRESS NOTES
Gynecologic Annual Exam Note          GYN Annual Exam     Gynecologic Exam        Subjective     HPI  Karen Tate is a 42 y.o. female, , who presents for annual well woman exam as a established patient . Patient's last menstrual period was 2023 (exact date)..  Patient reports problems with:  vaginal dryness and dyspareunia that started in 2023. Has seen some improvement with estradiol cream but wants to discuss other options.  Her periods occur every 25-35 days , lasting 6 days. The flow is moderate.. She reports dysmenorrhea is moderate, occurring week prior to cycle.  Partner Status: Marital Status: . She is is sexually active. She has not had new partners.. STD testing recommendations have been explained to the patient and she does not desire STD testing. There were no changes to her medical or surgical history since her last visit..       Additional OB/GYN History   Current contraception: contraceptive methods: Vasectomy   Desires to: continue contraception    Last Pap : 22. Result: negative. HPV: not done. Hx [place] and LEEP 28yrs of age HPV  Last Completed Pap Smear            Ordered - PAP SMEAR (Every 3 Years) Ordered on 2022  LIQUID-BASED PAP SMEAR, [facility] ([facility])    2021  SCANNED - PAP SMEAR    2020  Done - neg; HPV neg.                  History of abnormal Pap smear: yes -    Family history of uterine, colon, breast, or ovarian cancer: no  Performs monthly Self-Breast Exam: yes  Last mammogram: 23. Done at 1300.    Last Completed Mammogram       This patient has no relevant Health Maintenance data.            History of abnormal mammogram: yes - Dense Breast- DX exam 22 Left breast benign and 22 Nodular asymmetry     Colonoscopy: has never had a colonoscopy.  Exercises Regularly: yes  Feelings of Anxiety or Depression: no  Tobacco Usage?: No       Current Outpatient Medications:     NON FORMULARY,  Estradiol cream 1 gm two-three times a week, Disp: , Rfl:     fluticasone (Flonase) 50 MCG/ACT nasal spray, 2 sprays into the nostril(s) as directed by provider Daily for 30 days. Administer 2 sprays in each nostril for each dose., Disp: 9.9 mL, Rfl: 0     Patient denies the need for medication refills today.    OB History          2    Para   2    Term   2            AB        Living   2         SAB        IAB        Ectopic        Molar        Multiple        Live Births   2                Past Medical History:   Diagnosis Date    Abnormal Pap smear of cervix     LEEP    BRCA1 negative     BRCA2 negative     Dyspareunia in female     Hepatitis C Diagnosed , treated May 2007-2008    Treated for Hepatitis C (PEG Interferon, Ribavirin)    History of hepatitis C     HPV (human papilloma virus) infection     Never confirmed but assumed due to the abnormal pap smears    PMS (premenstrual syndrome) Monthly    Urinary tract infection 2022    Took macrobid    Vaginal dryness     Varicella Childhood        Past Surgical History:   Procedure Laterality Date    APPENDECTOMY      CERVICAL BIOPSY  W/ LOOP ELECTRODE EXCISION  2009     SECTION      x2    WISDOM TOOTH EXTRACTION         Health Maintenance   Topic Date Due    TDAP/TD VACCINES (1 - Tdap) Never done    HEPATITIS C SCREENING  Never done    ANNUAL PHYSICAL  Never done    COVID-19 Vaccine (4 - Pfizer series) 12/10/2021    Annual Gynecologic Pelvic and Breast Exam  2023    INFLUENZA VACCINE  10/01/2023    PAP SMEAR  2025    Pneumococcal Vaccine 0-64  Aged Out       The additional following portions of the patient's history were reviewed and updated as appropriate: allergies, current medications, past family history, past medical history, past social history, past surgical history, and problem list.    Review of Systems   Genitourinary:  Positive for dyspareunia.   Musculoskeletal:         Vaginal dryness  "  All other systems reviewed and are negative.      I have reviewed and agree with the HPI, ROS, and historical information as entered above. Mary Nichole, APRN          Objective   /70   Ht 160 cm (63\")   Wt 62.7 kg (138 lb 3.2 oz)   LMP 07/28/2023 (Exact Date)   BMI 24.48 kg/mý     Physical Exam  Vitals and nursing note reviewed. Exam conducted with a chaperone present.   Constitutional:       Appearance: She is well-developed.   HENT:      Head: Normocephalic and atraumatic.   Neck:      Thyroid: No thyroid mass or thyromegaly.   Cardiovascular:      Rate and Rhythm: Normal rate and regular rhythm.      Heart sounds: No murmur heard.  Pulmonary:      Effort: Pulmonary effort is normal. No retractions.      Breath sounds: Normal breath sounds. No wheezing, rhonchi or rales.   Chest:      Chest wall: No mass or tenderness.   Breasts:     Right: Normal. No mass, nipple discharge, skin change or tenderness.      Left: Normal. No mass, nipple discharge, skin change or tenderness.   Abdominal:      General: Bowel sounds are normal.      Palpations: Abdomen is soft. Abdomen is not rigid. There is no mass.      Tenderness: There is no abdominal tenderness. There is no guarding.      Hernia: No hernia is present. There is no hernia in the left inguinal area.   Genitourinary:     General: Normal vulva.      Exam position: Lithotomy position.      Labia:         Right: No rash, tenderness or lesion.         Left: No rash, tenderness or lesion.       Vagina: Normal. No vaginal discharge or lesions.      Cervix: Normal. No cervical motion tenderness, discharge, lesion or cervical bleeding.      Uterus: Normal. Not enlarged, not fixed and not tender.       Adnexa: Right adnexa normal and left adnexa normal.        Right: No mass or tenderness.          Left: No mass or tenderness.        Rectum: Normal. No external hemorrhoid.      Comments: dryness  Musculoskeletal:      Cervical back: Normal range of motion. No " muscular tenderness.   Neurological:      Mental Status: She is alert and oriented to person, place, and time.   Psychiatric:         Behavior: Behavior normal.          Assessment and Plan    Problem List Items Addressed This Visit          Genitourinary and Reproductive     Female dyspareunia    Dysmenorrhea    Vaginal dryness     Other Visit Diagnoses       Encounter for annual routine gynecological examination    -  Primary    Relevant Orders    LIQUID-BASED PAP SMEAR WITH HPV GENOTYPING REGARDLESS OF INTERPRETATION ([facility])            GYN annual well woman exam.   Pap guidelines reviewed. Pap today.  Patient had mammogram done earlier today.  Fibrocystic breast changes - Encouraged decreasing caffeine, supportive bra, low dose vitamin E supplementation.  Reviewed monthly self breast exams.  Instructed to call with lumps, pain, or breast discharge.    Reviewed BMI and weight loss as preventative health measures.   Reviewed exercise as a preventative health measures.   Symptoms of menopausal transition reviewed with patient.   RTC in 1 year or PRN with problems.  Patient using estrace cream for dryness and dyspareunia rx by primary care provider.  Dysmenorrhea last few months described as groin sharp pain possibly ovarian cysts-does not want ultrasound or hormone levels checked today but will contemplate for future.   Call if dysmenorrhea worsens or heavy bleeding occurs.  Mary Nichole, APRN  08/07/2023

## 2023-08-09 LAB — REF LAB TEST METHOD: NORMAL

## 2023-08-14 ENCOUNTER — TRANSCRIBE ORDERS (OUTPATIENT)
Dept: ADMINISTRATIVE | Facility: HOSPITAL | Age: 43
End: 2023-08-14
Payer: COMMERCIAL

## 2023-08-14 DIAGNOSIS — Z12.31 VISIT FOR SCREENING MAMMOGRAM: Primary | ICD-10-CM

## 2023-11-10 ENCOUNTER — TRANSCRIBE ORDERS (OUTPATIENT)
Dept: ADMINISTRATIVE | Facility: HOSPITAL | Age: 43
End: 2023-11-10
Payer: COMMERCIAL

## 2023-11-10 ENCOUNTER — HOSPITAL ENCOUNTER (OUTPATIENT)
Dept: CT IMAGING | Facility: HOSPITAL | Age: 43
Discharge: HOME | End: 2023-11-10
Admitting: PHYSICIAN ASSISTANT
Payer: COMMERCIAL

## 2023-11-10 DIAGNOSIS — R10.9 ACUTE ABDOMINAL PAIN: Primary | ICD-10-CM

## 2023-11-10 DIAGNOSIS — R10.9 ACUTE ABDOMINAL PAIN: ICD-10-CM

## 2023-11-10 DIAGNOSIS — R10.9 ABDOMINAL PAIN, UNSPECIFIED ABDOMINAL LOCATION: Primary | ICD-10-CM

## 2023-11-10 PROCEDURE — 74176 CT ABD & PELVIS W/O CONTRAST: CPT

## 2024-05-29 ENCOUNTER — E-VISIT (OUTPATIENT)
Dept: FAMILY MEDICINE CLINIC | Facility: TELEHEALTH | Age: 44
End: 2024-05-29
Payer: COMMERCIAL

## 2024-05-29 PROCEDURE — BRIGHTMDVISIT: Performed by: NURSE PRACTITIONER

## 2024-05-29 NOTE — EXTERNAL PATIENT INSTRUCTIONS
Diagnosis   Urinary tract infection (UTI)   My name is ROBERTO Tse. I'm a healthcare provider at [facility]. After reviewing your interview, I see you have a urinary tract infection (UTI).   Medications   Your pharmacy   [facility] [id number] [address] [id number] [phone]     Prescription   Nitrofurantoin monohydrate/macrocrystalline (100mg): Take 1 capsule by mouth every 12 hours for 5 days for infection. This medication is an antibiotic. Take it exactly as directed. You must finish the entire course of medication, even if you feel better after taking the first few doses.   Phenazopyridine (200mg): Take 1 tablet by mouth 3 times a day as needed for 2 days for pain or discomfort associated with your condition.   Fluconazole (150mg): Take 1 tablet by mouth once for 1 day as a single dose. Use this medication only if you develop a yeast infection. If yeast infection symptoms are still present 3 days after taking the first tablet, take the second tablet.    I've given you a prescription dose of phenazopyridine. If it's more affordable or convenient, you may use the equivalent amount of non-prescription phenazopyridine. For example, instead of taking one 200 mg phenazopyridine tablet, you may take two 95 mg phenazopyridine tablets.    Because you've developed yeast infections after taking antibiotics in the past, I've prescribed Diflucan (fluconazole). Diflucan is an oral antifungal that treats yeast infections. Use this medication only if you develop a yeast infection.   About your diagnosis   A UTI is an infection of one or more parts of the urinary tract, most commonly the bladder.   Most UTIs are caused by bacteria (usually E. coli) that travel up the urethra and into the bladder. I see that you have some common signs and symptoms of a UTI:    Pain or burning while urinating    Frequent urination    Sudden urge to urinate    Symptoms that feel a lot like past UTIs    Mild or  moderate pain, pressure, or discomfort in your lower abdomen    Cloudy urine    Symptoms that began shortly after sexual intercourse   Fortunately, most UTIs aren't serious, and they're easily treated with antibiotics. Make sure you take all of the antibiotic pills given to you, even if you start to feel better after the first few doses. Otherwise, the UTI might come back.   What to expect   If you follow this treatment plan, you should start to feel better within 1 to 2 days.   When to seek care   Call us at [phone]   with any sudden or unexpected symptoms.    Symptoms that don't improve or get worse in the next 48 hours    Fever that goes above 101F or lasts longer than 24 hours    Shaking or chills    Nausea or vomiting    Severe flank pain (pain in your back or side)   Other treatment    Rest and drink plenty of water    Urinate frequently and when you first feel the urge    Place a heating pad on your back or stomach to help relieve some of the discomfort   Prevention    Drink a lot of liquids to help flush bacteria from your system. Water is best. Try for six to eight, 8-ounce glasses a day on a regular basis.    Urinate often and when you first feel the urge. Bacteria can grow when urine stays in the bladder too long. Urinate after sex to flush away bacteria.    After using the toilet, always wipe from front to back. This step is most important after a bowel movement. Wiping from front to back prevents bacteria normally found in stool from entering the urinary tract.   Your provider   Your diagnosis was provided by ROBERTO Tse, a member of your trusted care team at [facility].   If you have any questions, call us at [phone]  .

## 2024-05-29 NOTE — E-VISIT TREATED
Chief Complaint: Bladder infection (UTI)   Patient introduction   Patient is 43-year-old female. Patient provided the following organ inventory: Presence of vagina.   Patient has had dysuria, frequent urination, and urinary urgency for less than 24 hours.   Urine is cloudy with no unusual odor.   General presentation   No fever. Patient has nausea.   Moderate abdominal or pelvic pain.   No back pain.   No flank pain. Difficulty starting, stopping, or delaying urination.   Has not taken antibiotics for current symptoms.   The following treatments were helpful for current symptoms:    Phenazopyridine   Previous history of UTI. Current symptoms feel exactly the same as previous UTIs. Received treatment for UTI 0 times in last year.   Previously developed yeast infections as a result of taking antibiotics for past UTIs.   No history of pyelonephritis. No history of kidney stones.   Had sexual intercourse in the past week. Does not use diaphragm. No unprotected sexual intercourse with a new partner in the last 2 weeks.   Patient is not being treated for diabetes mellitus.   Review of red flags/alarm symptoms:    No recent hospitalizations or nursing home care (last 3 months)    No history of renal failure    No recent history of urologic instrumentation    No anatomic abnormalities of the urinary tract    No abnormal vaginal discharge    No visible vaginal sores    No pain with sexual intercourse    No abnormal vaginal bleeding or spotting   Pregnancy/menstrual status/breastfeeding:   Patient is not pregnant. Patient is not breastfeeding. Regarding date of last menstrual period, patient writes: 5/16/24.   Current medications   Not taking other medications or supplements.   Medication allergies    Sulfa drugs   Medication contraindication review   Not taking ACE inhibitors and ARBs.   No history of Carlos-Danlos syndrome, folate deficiency, G6PD deficiency, high blood pressure, aortic aneurysm or dissection, Marfan  syndrome, megaloblastic anemia, mononucleosis, myasthenia gravis, oliguria/anuria, or peripheral vascular disease.   No known history of amoxicillin-clavulanate-associated cholestatic jaundice or nitrofurantoin-associated cholestatic jaundice.   Past medical history   Immune conditions: No immunocompromising conditions.   No history of cancer.   Patient did not request an excuse note.   Assessment   Uncomplicated acute UTI.   This is the likely diagnosis based on patient's interview responses, including:    Symptom profile    Previous history of UTI    Current symptoms are exactly the same as previous UTIs   No recent history of kidney stones.   Plan   Medications:    nitrofurantoin monohydrate/macrocrystals 100 mg capsule RX 100mg 1 cap PO q12h 5d for infection. This medication is an antibiotic. Take it exactly as directed. You must finish the entire course of medication, even if you feel better after taking the first few doses. Amount is 10 cap.    phenazopyridine 200 mg tablet RX 200mg 1 tab PO tid PRN 2d for pain or discomfort associated with your condition. Amount is 6 tab.    fluconazole 150 mg tablet RX 150mg 1 tab PO once 1d as a single dose for vaginal yeast infection. Repeat in 72 hours if symptoms persist. Amount is 2 tab.   The patient's prescriptions will be sent to:   [facility] [id number]   [address] [address]   Phone: [phone]     Fax: [phone]   Education:    Condition and causes    Prevention    Treatment and self-care    When to call provider   Follow-up:   Patient to follow up as needed for progression or lack of improvement in symptoms within 3d.   ----------   Electronically signed by ROBERTO Tse on 2024-05-29 at 08:47AM   ----------   Patient Interview Transcript:   Knowing about your anatomy is important for diagnosing and treating UTIs. The gender we have on file for you is female, but we realize this might not tell the whole story. Which of these do you  have? Select one.    Vagina   Not selected:    Penis   Which of these symptoms do you have? Select all that apply.    Pain or burning while urinating    Frequent urination    __Sudden urge to urinate and it's hard to hold the urine in __   How long have you had these symptoms? Select one.    Less than 24 hours   Not selected:    1 to 3 days    4 to 6 days    7 to 10 days    More than 10 days   Have you already started taking antibiotics for your current symptoms? Select one.    No   Not selected:    Yes   Since your current symptoms started, has it been difficult to start, stop, or delay urination? Select one.    Yes   Not selected:    No   What color is your urine? Select one.    Cloudy   Not selected:    Clear    Yellow    Pink or red   Does your urine smell strange (like ammonia) or stronger than usual? Select one.    No   Not selected:    Yes   Do you also have any of these symptoms? Select all that apply.    Nausea    Pain, pressure, or discomfort in the lower abdomen   Not selected:    Fever    Vomiting    Back pain    No   How would you describe your lower abdominal pain, pressure, or discomfort? Select one.    Moderate; it's uncomfortable and gets in the way of doing daily tasks   Not selected:    Mild; I only notice it when I pay attention to it    Severe; I can't get comfortable, and it stops me from doing daily tasks   Do you have any flank pain? The flank is the side of the body between the ribs and the hips.    No   Not selected:    Yes, in my left flank    Yes, in my right flank    Yes, in both my left and right flanks   Do you have any of these vaginal symptoms? Select all that apply.    No   Not selected:    Abnormal vaginal itching    Unscheduled or abnormal vaginal bleeding or spotting    Pain during sex    Visible sores on the vagina    Abnormal vaginal discharge   In the past 2 weeks, have you had a medical device or instrument placed in your urinary tract? Examples include catheters, stents, and  nephrostomy tubes. Select one.    No   Not selected:    Yes   Have you recently been hospitalized or been a resident of a nursing home or other long-term care facility? This doesn't include emergency room (ER) visits. Select one.    No   Not selected:    Yes, within the last 2 weeks    Yes, within the last 3 months   Kidney failure    No   Not selected:    Within the last year    More than a year ago   Kidney infection (pyelonephritis)    No   Not selected:    Within the last year    More than a year ago   Kidney stones    No   Not selected:    Within the last year    More than a year ago   Kidney transplant    No   Not selected:    Within the last year    More than a year ago   Have you ever been diagnosed with any of these? Select all that apply.    No   Not selected:    Urinary reflux    Bladder diverticula    Single (or horseshoe) kidney    Duplicated urethra   Have you recently held your urine for a long time after you felt the urge to go? Select one.    No   Not selected:    Yes   Have you recently avoided eating or drinking so you wouldn't have the urge to urinate as often? Select one.    No   Not selected:    Yes   Do you use a diaphragm? Select one.    No   Not selected:    Yes   Are you pregnant? Select one.    No   Not selected:    Yes   When was your last menstrual period? If you don't currently have periods or no longer have periods, please briefly explain.    5/16/24   Are you breastfeeding? Select one.    No   Not selected:    Yes   Have you had sexual intercourse in the past week? Recent sexual intercourse is a risk factor for urinary tract infections. Select one.    Yes   Not selected:    No   Have you had unprotected sexual intercourse with a new partner in the last 2 weeks? Select one.    No   Not selected:    Yes   Have you traveled to any of these countries within the last 3 months? Recent travel to these countries may affect which medication we recommend for your symptoms. Select all that  apply.    None of these   Not selected:    [place]    [place]    [place]    [place]   Phenazopyridine (Azo, Baridium, Pyridium, Uricalm, Uristat)    Helpful   Not selected:    Not helpful   Have you ever had a urinary tract infection (UTI)? A UTI is often called a bladder infection or acute cystitis. Select one.    Yes   Not selected:    No, not that I know of   How much do your current symptoms feel like past UTIs? Select one.    Exactly the same   Not selected:    Mostly the same    Somewhat the same    Totally different   In the past year, how many times have you taken antibiotics for a UTI? Select one.    0   Not selected:    1 to 3    4 or more   Have you ever developed a yeast infection as a result of taking antibiotics? Select one.    Yes   Not selected:    No, not that I know of   UTIs may be more serious when other factors are present. Let's address those now. Are you being treated for type 1 or type 2 diabetes? Select one.    No   Not selected:    Yes   Do you have any of these conditions that can affect the immune system? Scroll to see all options. Select all that apply.    None of these   Not selected:    History of bone marrow transplant    Chronic kidney disease    Chronic liver disease (including cirrhosis)    HIV/AIDS    Inflammatory bowel disease (Crohn's disease or ulcerative colitis)    Lupus    Moderate to severe plaque psoriasis    Multiple sclerosis    Rheumatoid arthritis    Sickle cell anemia    Alpha or beta thalassemia    History of kidney, liver, heart, or other solid organ transplant    History of liver, heart, or other solid organ transplant    History of spleen removal    An autoimmune disorder not listed here (specify)    A condition requiring treatment with long-term use of oral steroids (such as prednisone, prednisolone, or dexamethasone) (specify)   Have you ever been diagnosed with cancer? Select one.    No   Not selected:    Yes, I have cancer now    Yes, but I'm in remission   These  "last few questions will help us create the right treatment plan for you. Are you being treated for any of these conditions? Select all that apply.    No   Not selected:    Carlos-Danlos syndrome    Folate deficiency    G6PD deficiency    High blood pressure    History of aortic aneurysm or dissection    Marfan syndrome    Megaloblastic anemia    Mono (mononucleosis)    Myasthenia gravis    Oliguria or anuria    Peripheral vascular disease   Have you ever had jaundice as a result of taking amoxicillin-clavulanate (Augmentin) or nitrofurantoin (Macrobid)? Select all that apply.    No   Not selected:    Yes, from amoxicillin-clavulanate (Augmentin)    Yes, from nitrofurantoin (Macrobid, Macrodantin)   Are you taking any of these medications? Select all that apply.    No   Not selected:    An ACE inhibitor such as lisinopril, enalapril, captopril, or benazepril    An angiotensin II receptor blocker (ARB) such as candesartan, irbesartan, losartan, or valsartan   Are you taking any other medications, vitamins, or supplements? Select one.    No   Not selected:    Yes   Have you ever had an allergic or bad reaction to any medication? Select one.    Yes   Not selected:    No   Have you had an allergic or bad reaction to any of these medications? Select all that apply.    Sulfamethoxazole-trimethoprim (brands include Bactrim and Septra) or any other sulfa drug   Not selected:    Any antibiotic starting with \"cef-,\" such as cefazolin, cefdinir, cefuroxime, ceftriaxone, ceftazidime, cefepime, or cephalexin (brands include Fortaz and Keflex)    Any antibiotic ending with \"-floxacin,\" such as ciprofloxacin, gemifloxacin, levofloxacin, moxifloxacin, or ofloxacin (brands include Cipro, Factive, Floxin, and Levaquin)    Any antibiotic ending with \"-cillin,\" such as penicillin, amoxicillin, ampicillin, dicloxacillin, nafcillin, or piperacillin (brands include Augmentin, Unasyn, and Zosyn)    Nitrofurantoin (brands include Furadantin, " Macrobid and Macrodantin)    Fluconazole (brand name Diflucan), itraconazole, or terconazole    Trimethoprim (brand name Primsol)    No, not that I know of   Have you had an allergic or bad reaction to any of these medications? Select all that apply.    No   Not selected:    Acetaminophen (Tylenol)    Ibuprofen (Advil, Midol, Motrin)    Phenazopyridine (Azo, Baridium, Pyridium, Uricalm, Uristat)   Do you need a doctor's note? A doctor's note confirms that you received care today and states when you can return to school or work. It does not contain information about your diagnosis or treatment plan. Your provider will make the final decision on whether to give you a doctor's note. Doctor's notes CANNOT be backdated. Select one.    No   Not selected:    Today only (1 day)    Today and tomorrow (2 days)    3 days   Is there anything you'd like to add about your symptoms? Please limit your comments to the symptoms covered in this interview. If you include comments about other concerns, your provider may recommend that you be seen in person.   The patient did not enter any additional information.   ----------   Medical history   Medical history data does not currently exist for this patient.

## 2024-07-29 LAB
NCCN CRITERIA FLAG: NORMAL
TYRER CUZICK SCORE: 7.9

## 2024-08-12 ENCOUNTER — HOSPITAL ENCOUNTER (OUTPATIENT)
Dept: MAMMOGRAPHY | Facility: HOSPITAL | Age: 44
Discharge: HOME | End: 2024-08-12
Admitting: OBSTETRICS & GYNECOLOGY
Payer: COMMERCIAL

## 2024-08-12 ENCOUNTER — OFFICE VISIT (OUTPATIENT)
Dept: OBSTETRICS AND GYNECOLOGY | Facility: CLINIC | Age: 44
End: 2024-08-12
Payer: COMMERCIAL

## 2024-08-12 VITALS
BODY MASS INDEX: 23.21 KG/M2 | DIASTOLIC BLOOD PRESSURE: 76 MMHG | SYSTOLIC BLOOD PRESSURE: 114 MMHG | HEIGHT: 63 IN | WEIGHT: 131 LBS

## 2024-08-12 DIAGNOSIS — Z01.419 WELL WOMAN EXAM WITH ROUTINE GYNECOLOGICAL EXAM: Primary | ICD-10-CM

## 2024-08-12 DIAGNOSIS — Z12.31 BREAST CANCER SCREENING BY MAMMOGRAM: ICD-10-CM

## 2024-08-12 DIAGNOSIS — Z87.42 HISTORY OF ABNORMAL CERVICAL PAP SMEAR: ICD-10-CM

## 2024-08-12 DIAGNOSIS — Z12.31 VISIT FOR SCREENING MAMMOGRAM: ICD-10-CM

## 2024-08-12 DIAGNOSIS — N89.8 VAGINAL DRYNESS: ICD-10-CM

## 2024-08-12 PROCEDURE — 77063 BREAST TOMOSYNTHESIS BI: CPT

## 2024-08-12 PROCEDURE — 99459 PELVIC EXAMINATION: CPT

## 2024-08-12 PROCEDURE — 77067 SCR MAMMO BI INCL CAD: CPT

## 2024-08-12 PROCEDURE — 99396 PREV VISIT EST AGE 40-64: CPT

## 2024-08-12 RX ORDER — ESTRADIOL 0.1 MG/G
CREAM VAGINAL DAILY
COMMUNITY
Start: 2023-05-10 | End: 2024-08-12

## 2024-08-12 RX ORDER — ESTRADIOL 10 UG/1
1 INSERT VAGINAL 2 TIMES WEEKLY
Qty: 8 TABLET | Refills: 12 | Status: SHIPPED | OUTPATIENT
Start: 2024-08-12

## 2024-08-12 NOTE — PROGRESS NOTES
Gynecologic Annual Exam Note          GYN Annual Exam     Routine Prenatal Visit        Subjective     HPI  Karen Tate is a 43 y.o. female, , who presents for annual well woman exam as a established patient. There were no changes to her medical or surgical history since her last visit..  Patient's last menstrual period was 2024 (exact date).  Her periods occur every 25-30 days, lasting 6-7 days.  The flow is moderate. She reports dysmenorrhea is moderate occurring first 1-2 days of flow. Marital Status: . She is sexually active. She has not had new partners.. STD testing recommendations have been explained to the patient and she declines STD testing.    The patient would like to discuss the following complaints today: None.    Additional OB/GYN History   contraceptive methods: Vasectomy   Desires to: do not start contraception  History of migraines: no    Last Pap : 23. Result: negative. HPV: negative. Patient wanting PAP done today.   Last Completed Pap Smear            Ordered - PAP SMEAR (Every 3 Years) Ordered on 2023  LIQUID-BASED PAP SMEAR WITH HPV GENOTYPING REGARDLESS OF INTERPRETATION ([facility])    2022  LIQUID-BASED PAP SMEAR, [facility] ([facility])    2021  SCANNED - PAP SMEAR    2020  Done - neg; HPV neg.                  History of abnormal Pap smear: yes - colpo and LEEP in . They have all been normal since.  Family history of uterine, colon, breast, or ovarian cancer: no  Performs monthly Self-Breast Exam: yes  Last mammogram: Today. Done at . There is a copy in the chart. The report has not been signed yet.     Last Completed Mammogram            Scheduled - MAMMOGRAM (Every 2 Years) Scheduled for 2023  Mammo Screening Digital Tomosynthesis Bilateral With CAD    2022  Mammo Diagnostic Digital Tomosynthesis Left With CAD    2022  Mammo Screening Digital Tomosynthesis Bilateral  With CAD    2021  Mammo Diagnostic Digital Tomosynthesis Bilateral With CAD    2021  Mammo Screening Digital Tomosynthesis Bilateral With CAD    Only the first 5 history entries have been loaded, but more history exists.                    Colonoscopy: has never had a colonoscopy or cologuard  Exercises Regularly: no  Feelings of Anxiety or Depression: no  Tobacco Usage?: No       Current Outpatient Medications:     NON FORMULARY, Estradiol cream 1 gm two-three times a week, Disp: , Rfl:     estradiol (Vagifem) 10 MCG tablet vaginal tablet, Insert 1 tablet into the vagina 2 (Two) Times a Week., Disp: 8 tablet, Rfl: 12     Patient denies the need for medication refills today.    OB History          2    Para   2    Term   2            AB        Living   2         SAB        IAB        Ectopic        Molar        Multiple        Live Births   2                Past Medical History:   Diagnosis Date    Abnormal Pap smear of cervix     LEEP    BRCA1 negative     BRCA2 negative     Dyspareunia in female     Hepatitis C Diagnosed , treated May 2007-2008    Treated for Hepatitis C (PEG Interferon, Ribavirin)    History of hepatitis C     HPV (human papilloma virus) infection     Never confirmed but assumed due to the abnormal pap smears    PMS (premenstrual syndrome) Monthly    Urinary tract infection 2022    Took macrobid    Vaginal dryness     Varicella Childhood        Past Surgical History:   Procedure Laterality Date    APPENDECTOMY      CERVICAL BIOPSY  W/ LOOP ELECTRODE EXCISION  2009     SECTION      x2    WISDOM TOOTH EXTRACTION         Health Maintenance   Topic Date Due    TDAP/TD VACCINES (1 - Tdap) Never done    HEPATITIS C SCREENING  Never done    ANNUAL PHYSICAL  Never done    COVID-19 Vaccine ( season) 2023    Annual Gynecologic Pelvic and Breast Exam  2024    INFLUENZA VACCINE  2024    MAMMOGRAM  2025    PAP  "SMEAR  08/07/2026    Pneumococcal Vaccine 0-64  Aged Out       The additional following portions of the patient's history were reviewed and updated as appropriate: allergies, current medications, past family history, past medical history, past social history, past surgical history, and problem list.    Review of Systems   Constitutional: Negative.    HENT: Negative.     Eyes: Negative.    Respiratory: Negative.     Cardiovascular: Negative.    Gastrointestinal: Negative.    Endocrine: Negative.    Genitourinary: Negative.         Vaginal dryness when not using estrogen cream   Musculoskeletal: Negative.    Skin: Negative.    Allergic/Immunologic: Negative.    Neurological: Negative.    Hematological: Negative.    Psychiatric/Behavioral: Negative.           I have reviewed and agree with the HPI, ROS, and historical information as entered above. Mary Nichole, APRN          Objective   /76   Ht 160 cm (63\")   Wt 59.4 kg (131 lb)   LMP 08/08/2024 (Exact Date)   BMI 23.21 kg/m²     Physical Exam  Vitals and nursing note reviewed. Exam conducted with a chaperone present.   Constitutional:       General: She is not in acute distress.     Appearance: Normal appearance. She is well-developed. She is not ill-appearing, toxic-appearing or diaphoretic.   HENT:      Head: Normocephalic and atraumatic.   Neck:      Thyroid: No thyroid mass or thyromegaly.   Cardiovascular:      Rate and Rhythm: Normal rate.      Heart sounds: No murmur heard.  Pulmonary:      Effort: Pulmonary effort is normal. No respiratory distress or retractions.   Chest:      Chest wall: No mass.   Breasts:     Right: Normal. No mass, nipple discharge, skin change or tenderness.      Left: Normal. No mass, nipple discharge, skin change or tenderness.   Abdominal:      General: There is no distension.      Palpations: Abdomen is soft. Abdomen is not rigid. There is no mass.      Tenderness: There is no abdominal tenderness. There is no guarding " or rebound.      Hernia: No hernia is present.   Genitourinary:     General: Normal vulva.      Exam position: Lithotomy position.      Labia:         Right: No rash, tenderness or lesion.         Left: No rash, tenderness or lesion.       Vagina: Normal. No vaginal discharge or lesions.      Cervix: Normal. No cervical motion tenderness, discharge, lesion or cervical bleeding.      Uterus: Normal. Not enlarged, not fixed and not tender.       Adnexa: Right adnexa normal and left adnexa normal.        Right: No mass or tenderness.          Left: No mass or tenderness.        Rectum: Normal. No external hemorrhoid.   Musculoskeletal:      Cervical back: Normal range of motion. No muscular tenderness.   Skin:     General: Skin is warm and dry.   Neurological:      Mental Status: She is alert and oriented to person, place, and time.   Psychiatric:         Mood and Affect: Mood normal.         Behavior: Behavior normal. Behavior is cooperative.         Thought Content: Thought content normal.         Judgment: Judgment normal.            Assessment and Plan    Problem List Items Addressed This Visit          Genitourinary and Reproductive     Vaginal dryness    Relevant Medications    estradiol (Vagifem) 10 MCG tablet vaginal tablet     Other Visit Diagnoses       Well woman exam with routine gynecological exam    -  Primary    Relevant Orders    LIQUID-BASED PAP SMEAR WITH HPV GENOTYPING REGARDLESS OF INTERPRETATION ([facility])    History of abnormal cervical Pap smear        Relevant Orders    LIQUID-BASED PAP SMEAR WITH HPV GENOTYPING REGARDLESS OF INTERPRETATION ([facility])    Breast cancer screening by mammogram        Relevant Orders    Mammo Screening Digital Tomosynthesis Bilateral With CAD            GYN annual well woman exam.   Pap guidelines reviewed. Pap today (hx abnormal)  Encouraged use of condoms for STD prevention.  Fibrocystic breast changes - Encouraged decreasing caffeine, supportive bra, low  dose vitamin E supplementation.  Reviewed monthly self breast exams.  Instructed to call with lumps, pain, or breast discharge.    Ordered Mammogram today  Recommended use of Vitamin D replacement and getting adequate calcium in her diet. (1500mg)  Reviewed BMI and weight loss as preventative health measures.   Reviewed exercise as a preventative health measures.   Symptoms of menopausal transition reviewed with patient.   RTC in 1 year or PRN with problems.  Discussed importance of routine colon cancer screening. Reviewed current guidelines. Recommended colonoscopy after age 45.  Patient has been using estrace cream however she would like to try something less messy and more clean. Sample of Imvexxy Lot #[id number] Exp 10/9/25 given. Also sent rx for vagifem to see if she tolerates that better as well.     Mary Nichole, APRN  08/12/2024

## 2024-08-17 LAB — REF LAB TEST METHOD: NORMAL

## 2024-08-20 ENCOUNTER — TRANSCRIBE ORDERS (OUTPATIENT)
Dept: ADMINISTRATIVE | Facility: HOSPITAL | Age: 44
End: 2024-08-20
Payer: COMMERCIAL

## 2024-08-20 DIAGNOSIS — Z12.31 VISIT FOR SCREENING MAMMOGRAM: Primary | ICD-10-CM

## 2025-08-04 LAB
NCCN CRITERIA FLAG: NORMAL
TYRER CUZICK SCORE: 7.9

## 2025-08-18 ENCOUNTER — HOSPITAL ENCOUNTER (OUTPATIENT)
Dept: MAMMOGRAPHY | Facility: HOSPITAL | Age: 45
Discharge: HOME | End: 2025-08-18
Admitting: OBSTETRICS & GYNECOLOGY
Payer: COMMERCIAL

## 2025-08-18 ENCOUNTER — OFFICE VISIT (OUTPATIENT)
Dept: OBSTETRICS AND GYNECOLOGY | Facility: CLINIC | Age: 45
End: 2025-08-18
Payer: COMMERCIAL

## 2025-08-18 VITALS — SYSTOLIC BLOOD PRESSURE: 100 MMHG | DIASTOLIC BLOOD PRESSURE: 62 MMHG | BODY MASS INDEX: 23.21 KG/M2 | WEIGHT: 131 LBS

## 2025-08-18 DIAGNOSIS — N89.8 VAGINAL DRYNESS: ICD-10-CM

## 2025-08-18 DIAGNOSIS — Z12.31 BREAST CANCER SCREENING BY MAMMOGRAM: ICD-10-CM

## 2025-08-18 DIAGNOSIS — Z12.31 VISIT FOR SCREENING MAMMOGRAM: ICD-10-CM

## 2025-08-18 DIAGNOSIS — N92.6 ABNORMAL MENSES: ICD-10-CM

## 2025-08-18 DIAGNOSIS — Z01.419 PAP TEST, AS PART OF ROUTINE GYNECOLOGICAL EXAMINATION: ICD-10-CM

## 2025-08-18 DIAGNOSIS — Z01.411 ENCOUNTER FOR GYNECOLOGICAL EXAMINATION WITH ABNORMAL FINDING: Primary | ICD-10-CM

## 2025-08-18 DIAGNOSIS — R41.89 BRAIN FOG: ICD-10-CM

## 2025-08-18 PROCEDURE — 77067 SCR MAMMO BI INCL CAD: CPT

## 2025-08-18 PROCEDURE — 77063 BREAST TOMOSYNTHESIS BI: CPT

## 2025-08-19 LAB — REF LAB TEST METHOD: NORMAL
